# Patient Record
Sex: FEMALE | Race: WHITE | NOT HISPANIC OR LATINO | Employment: OTHER | URBAN - NONMETROPOLITAN AREA
[De-identification: names, ages, dates, MRNs, and addresses within clinical notes are randomized per-mention and may not be internally consistent; named-entity substitution may affect disease eponyms.]

---

## 2018-03-29 ENCOUNTER — OFFICE VISIT CONVERTED (OUTPATIENT)
Dept: FAMILY MEDICINE CLINIC | Age: 68
End: 2018-03-29
Attending: FAMILY MEDICINE

## 2018-03-30 LAB
ALBUMIN SERPL-MCNC: 4.4 G/DL
ALBUMIN/GLOB SERPL: 2 {RATIO}
ALP SERPL-CCNC: 69 IU/L
ALT SERPL-CCNC: 14 IU/L
AST SERPL-CCNC: 19 IU/L
BILIRUB SERPL-MCNC: 0.3 MG/DL
BUN SERPL-MCNC: 23 MG/DL
BUN/CREAT SERPL: 24
CALCIUM SERPL-MCNC: 9.1 MG/DL
CHLORIDE SERPL-SCNC: 102 MMOL/L
CHOLEST SERPL-MCNC: 227 MG/DL
CO2 SERPL-SCNC: 24 MMOL/L
CONV TOTAL PROTEIN: 6.6 G/DL
CREAT UR-MCNC: 0.97 MG/DL
ERYTHROCYTE [DISTWIDTH] IN BLOOD BY AUTOMATED COUNT: 14.6 %
GLOBULIN UR ELPH-MCNC: 2.2 G/DL
GLUCOSE SERPL-MCNC: 85 MG/DL
HCT VFR BLD AUTO: 37.1 %
HDLC SERPL-MCNC: 65 MG/DL
HGB BLD-MCNC: 12.1 G/DL
LDLC SERPL CALC-MCNC: 142 MG/DL
MCH RBC QN AUTO: 26.2 PG
MCHC RBC AUTO-ENTMCNC: 32.6 G/DL
MCV RBC AUTO: 80 FL
PLATELET # BLD AUTO: 194 X10E3/UL
POTASSIUM SERPL-SCNC: 4.6 MMOL/L
RBC # BLD AUTO: 4.62 X10E6/UL
SODIUM SERPL-SCNC: 142 MMOL/L
TRIGL SERPL-MCNC: 98 MG/DL
VLDLC SERPL-MCNC: 20 MG/DL
WBC # BLD AUTO: 4.1 X10E3/UL

## 2018-11-12 ENCOUNTER — OFFICE VISIT CONVERTED (OUTPATIENT)
Dept: FAMILY MEDICINE CLINIC | Age: 68
End: 2018-11-12
Attending: FAMILY MEDICINE

## 2018-12-11 ENCOUNTER — OFFICE VISIT CONVERTED (OUTPATIENT)
Dept: FAMILY MEDICINE CLINIC | Age: 68
End: 2018-12-11
Attending: FAMILY MEDICINE

## 2019-01-29 ENCOUNTER — HOSPITAL ENCOUNTER (OUTPATIENT)
Dept: OTHER | Facility: HOSPITAL | Age: 69
Discharge: HOME | End: 2019-01-29
Attending: FAMILY MEDICINE

## 2019-01-29 LAB
ANION GAP SERPL CALC-SCNC: 17 MMOL/L (ref 8–19)
BASOPHILS # BLD MANUAL: 0.03 10*3/UL (ref 0–0.2)
BASOPHILS NFR BLD MANUAL: 0.7 % (ref 0–3)
BUN SERPL-MCNC: 29 MG/DL (ref 5–25)
BUN/CREAT SERPL: 30 {RATIO} (ref 6–20)
CALCIUM SERPL-MCNC: 9.3 MG/DL (ref 8.7–10.4)
CHLORIDE SERPL-SCNC: 102 MMOL/L (ref 99–111)
CONV CO2: 26 MMOL/L (ref 22–32)
CREAT UR-MCNC: 0.98 MG/DL (ref 0.5–0.9)
DEPRECATED RDW RBC AUTO: 42 FL
EOSINOPHIL # BLD MANUAL: 0.14 10*3/UL (ref 0–0.7)
EOSINOPHIL NFR BLD MANUAL: 3.2 % (ref 0–7)
ERYTHROCYTE [DISTWIDTH] IN BLOOD BY AUTOMATED COUNT: 14 % (ref 11.5–14.5)
GFR SERPLBLD BASED ON 1.73 SQ M-ARVRAT: 60 ML/MIN/{1.73_M2}
GLUCOSE SERPL-MCNC: 89 MG/DL (ref 65–99)
GRANS (ABSOLUTE): 2.33 10*3/UL (ref 2–8)
GRANS: 53.2 % (ref 30–85)
HBA1C MFR BLD: 11.5 G/DL (ref 12–16)
HCT VFR BLD AUTO: 36.4 % (ref 37–47)
IMM GRANULOCYTES # BLD: 0.01 10*3/UL (ref 0–0.54)
IMM GRANULOCYTES NFR BLD: 0.2 % (ref 0–0.43)
LYMPHOCYTES # BLD MANUAL: 1.45 10*3/UL (ref 1–5)
LYMPHOCYTES NFR BLD MANUAL: 9.6 % (ref 3–10)
MCH RBC QN AUTO: 25.8 PG (ref 27–31)
MCHC RBC AUTO-ENTMCNC: 31.6 G/DL (ref 33–37)
MCV RBC AUTO: 81.6 FL (ref 81–99)
MONOCYTES # BLD AUTO: 0.42 10*3/UL (ref 0.2–1.2)
OSMOLALITY SERPL CALC.SUM OF ELEC: 295 MOSM/KG (ref 273–304)
PLATELET # BLD AUTO: 208 10*3/UL (ref 130–400)
PMV BLD AUTO: 10.1 FL (ref 7.4–10.4)
POTASSIUM SERPL-SCNC: 4.5 MMOL/L (ref 3.5–5.3)
RBC # BLD AUTO: 4.46 10*6/UL (ref 4.2–5.4)
SODIUM SERPL-SCNC: 140 MMOL/L (ref 135–147)
VARIANT LYMPHS NFR BLD MANUAL: 33.1 % (ref 20–45)
WBC # BLD AUTO: 4.38 10*3/UL (ref 4.8–10.8)

## 2019-01-30 LAB
IRON SATN MFR SERPL: 10 % (ref 20–55)
IRON SERPL-MCNC: 44 UG/DL (ref 60–170)
RBC # BLD AUTO: 4.4 10*6/UL (ref 4.2–5.4)
RETICS # AUTO: 42.7 10*3/UL
RETICS/RBC NFR AUTO: 0.97 % (ref 0.5–1.7)
TIBC SERPL-MCNC: 455 UG/DL (ref 245–450)
TRANSFERRIN SERPL-MCNC: 318 MG/DL (ref 250–380)
WBC # BLD AUTO: 4.39 10*3/UL (ref 4.8–10.8)

## 2019-03-13 ENCOUNTER — HOSPITAL ENCOUNTER (OUTPATIENT)
Dept: OTHER | Facility: HOSPITAL | Age: 69
Discharge: HOME | End: 2019-03-13
Attending: FAMILY MEDICINE

## 2019-03-13 ENCOUNTER — OFFICE VISIT CONVERTED (OUTPATIENT)
Dept: FAMILY MEDICINE CLINIC | Age: 69
End: 2019-03-13
Attending: NURSE PRACTITIONER

## 2019-03-13 ENCOUNTER — HOSPITAL ENCOUNTER (OUTPATIENT)
Dept: OTHER | Facility: HOSPITAL | Age: 69
Discharge: HOME | End: 2019-03-13
Attending: NURSE PRACTITIONER

## 2019-03-13 LAB
ANION GAP SERPL CALC-SCNC: 14 MMOL/L (ref 8–19)
BUN SERPL-MCNC: 23 MG/DL (ref 5–25)
BUN/CREAT SERPL: 21 {RATIO} (ref 6–20)
CALCIUM SERPL-MCNC: 9.2 MG/DL (ref 8.7–10.4)
CHLORIDE SERPL-SCNC: 107 MMOL/L (ref 99–111)
CONV CO2: 26 MMOL/L (ref 22–32)
CREAT UR-MCNC: 1.08 MG/DL (ref 0.5–0.9)
ERYTHROCYTE [DISTWIDTH] IN BLOOD BY AUTOMATED COUNT: 16.7 % (ref 11.5–14.5)
GFR SERPLBLD BASED ON 1.73 SQ M-ARVRAT: 53 ML/MIN/{1.73_M2}
GLUCOSE SERPL-MCNC: 85 MG/DL (ref 65–99)
HBA1C MFR BLD: 11.9 G/DL (ref 12–16)
HCT VFR BLD AUTO: 37.7 % (ref 37–47)
IRON SATN MFR SERPL: 41 % (ref 20–55)
IRON SERPL-MCNC: 171 UG/DL (ref 60–170)
MCH RBC QN AUTO: 26.1 PG (ref 27–31)
MCHC RBC AUTO-ENTMCNC: 31.6 G/DL (ref 33–37)
MCV RBC AUTO: 82.7 FL (ref 81–99)
OSMOLALITY SERPL CALC.SUM OF ELEC: 299 MOSM/KG (ref 273–304)
PLATELET # BLD AUTO: 180 10*3/UL (ref 130–400)
PMV BLD AUTO: 10.2 FL (ref 7.4–10.4)
POTASSIUM SERPL-SCNC: 4.1 MMOL/L (ref 3.5–5.3)
RBC # BLD AUTO: 4.56 10*6/UL (ref 4.2–5.4)
SODIUM SERPL-SCNC: 143 MMOL/L (ref 135–147)
TIBC SERPL-MCNC: 422 UG/DL (ref 245–450)
TRANSFERRIN SERPL-MCNC: 295 MG/DL (ref 250–380)
WBC # BLD AUTO: 4.19 10*3/UL (ref 4.8–10.8)

## 2019-03-14 LAB
RBC # BLD AUTO: 4.52 10*6/UL (ref 4.2–5.4)
RETICS # AUTO: 0.06 10*6/UL (ref 0.02–0.08)
RETICS/RBC NFR AUTO: 1.34 % (ref 0.5–1.7)
WBC # BLD AUTO: 4.17 10*3/UL (ref 4.8–10.8)

## 2019-03-15 LAB — EPO SERPL-ACNC: 16.1 MIU/ML (ref 2.6–18.5)

## 2019-03-17 LAB
CONV LAST MENSTURAL PERIOD: NORMAL
SPECIMEN SOURCE: NORMAL
SPECIMEN SOURCE: NORMAL
THIN PREP CVX: NORMAL

## 2019-03-20 ENCOUNTER — HOSPITAL ENCOUNTER (OUTPATIENT)
Dept: OTHER | Facility: HOSPITAL | Age: 69
Discharge: HOME | End: 2019-03-20
Attending: FAMILY MEDICINE

## 2019-06-10 ENCOUNTER — HOSPITAL ENCOUNTER (OUTPATIENT)
Dept: OTHER | Facility: HOSPITAL | Age: 69
Discharge: HOME | End: 2019-06-10
Attending: FAMILY MEDICINE

## 2019-06-10 LAB
RBC # BLD AUTO: 4.49 10*6/UL (ref 4.2–5.4)
RETICS # AUTO: 0.06 10*6/UL (ref 0.02–0.08)
RETICS/RBC NFR AUTO: 1.26 % (ref 0.5–1.7)
WBC # BLD AUTO: 4.69 10*3/UL (ref 4.8–10.8)

## 2019-06-11 LAB — EPO SERPL-ACNC: 8.2 MIU/ML (ref 2.6–18.5)

## 2019-06-13 LAB
ANION GAP SERPL CALC-SCNC: 17 MMOL/L (ref 8–19)
BASOPHILS # BLD AUTO: 0.06 10*3/UL (ref 0–0.2)
BASOPHILS NFR BLD AUTO: 1.4 % (ref 0–3)
BUN SERPL-MCNC: 23 MG/DL (ref 5–25)
BUN/CREAT SERPL: 23 {RATIO} (ref 6–20)
CALCIUM SERPL-MCNC: 9.1 MG/DL (ref 8.7–10.4)
CHLORIDE SERPL-SCNC: 105 MMOL/L (ref 99–111)
CONV ABS IMM GRAN: 0.04 10*3/UL (ref 0–0.2)
CONV CO2: 25 MMOL/L (ref 22–32)
CONV IMMATURE GRAN: 0.9 % (ref 0–1.8)
CREAT UR-MCNC: 0.99 MG/DL (ref 0.5–0.9)
DEPRECATED RDW RBC AUTO: 46.3 FL (ref 36.4–46.3)
EOSINOPHIL # BLD AUTO: 0.1 10*3/UL (ref 0–0.7)
EOSINOPHIL # BLD AUTO: 2.3 % (ref 0–7)
ERYTHROCYTE [DISTWIDTH] IN BLOOD BY AUTOMATED COUNT: 13.7 % (ref 11.7–14.4)
GFR SERPLBLD BASED ON 1.73 SQ M-ARVRAT: 59 ML/MIN/{1.73_M2}
GLUCOSE SERPL-MCNC: 80 MG/DL (ref 65–99)
HBA1C MFR BLD: 12.9 G/DL (ref 12–16)
HCT VFR BLD AUTO: 41.1 % (ref 37–47)
IRON SATN MFR SERPL: 29 % (ref 20–55)
IRON SERPL-MCNC: 101 UG/DL (ref 60–170)
LYMPHOCYTES # BLD AUTO: 1.46 10*3/UL (ref 1–5)
MCH RBC QN AUTO: 28.9 PG (ref 27–31)
MCHC RBC AUTO-ENTMCNC: 31.4 G/DL (ref 33–37)
MCV RBC AUTO: 91.9 FL (ref 81–99)
MONOCYTES # BLD AUTO: 0.42 10*3/UL (ref 0.2–1.2)
MONOCYTES NFR BLD AUTO: 9.8 % (ref 3–10)
NEUTROPHILS # BLD AUTO: 2.2 10*3/UL (ref 2–8)
NEUTROPHILS NFR BLD AUTO: 51.5 % (ref 30–85)
NRBC CBCN: 0 % (ref 0–0.7)
OSMOLALITY SERPL CALC.SUM OF ELEC: 297 MOSM/KG (ref 273–304)
PLATELET # BLD AUTO: 160 10*3/UL (ref 130–400)
PMV BLD AUTO: 11.5 FL (ref 9.4–12.3)
POTASSIUM SERPL-SCNC: 4.7 MMOL/L (ref 3.5–5.3)
SODIUM SERPL-SCNC: 142 MMOL/L (ref 135–147)
TIBC SERPL-MCNC: 352 UG/DL (ref 245–450)
TRANSFERRIN SERPL-MCNC: 246 MG/DL (ref 250–380)
VARIANT LYMPHS NFR BLD MANUAL: 34.1 % (ref 20–45)

## 2019-06-14 ENCOUNTER — OFFICE VISIT CONVERTED (OUTPATIENT)
Dept: FAMILY MEDICINE CLINIC | Age: 69
End: 2019-06-14
Attending: FAMILY MEDICINE

## 2019-06-24 ENCOUNTER — HOSPITAL ENCOUNTER (OUTPATIENT)
Dept: OTHER | Facility: HOSPITAL | Age: 69
Discharge: HOME | End: 2019-06-24
Attending: FAMILY MEDICINE

## 2019-12-17 ENCOUNTER — OFFICE VISIT CONVERTED (OUTPATIENT)
Dept: FAMILY MEDICINE CLINIC | Age: 69
End: 2019-12-17
Attending: FAMILY MEDICINE

## 2020-01-06 ENCOUNTER — HOSPITAL ENCOUNTER (OUTPATIENT)
Dept: OTHER | Facility: HOSPITAL | Age: 70
Discharge: HOME | End: 2020-01-06
Attending: FAMILY MEDICINE

## 2020-01-06 LAB
ALBUMIN SERPL-MCNC: 4.1 G/DL (ref 3.5–5)
ALBUMIN/GLOB SERPL: 1.8 {RATIO} (ref 1.4–2.6)
ALP SERPL-CCNC: 76 U/L (ref 43–160)
ALT SERPL-CCNC: 36 U/L (ref 10–40)
ANION GAP SERPL CALC-SCNC: 16 MMOL/L (ref 8–19)
AST SERPL-CCNC: 22 U/L (ref 15–50)
BILIRUB SERPL-MCNC: 0.31 MG/DL (ref 0.2–1.3)
BUN SERPL-MCNC: 22 MG/DL (ref 5–25)
BUN/CREAT SERPL: 26 {RATIO} (ref 6–20)
CALCIUM SERPL-MCNC: 9.2 MG/DL (ref 8.7–10.4)
CHLORIDE SERPL-SCNC: 104 MMOL/L (ref 99–111)
CHOLEST SERPL-MCNC: 170 MG/DL (ref 107–200)
CHOLEST/HDLC SERPL: 3.8 {RATIO} (ref 3–6)
CONV CO2: 26 MMOL/L (ref 22–32)
CONV TOTAL PROTEIN: 6.4 G/DL (ref 6.3–8.2)
CREAT UR-MCNC: 0.85 MG/DL (ref 0.5–0.9)
GFR SERPLBLD BASED ON 1.73 SQ M-ARVRAT: >60 ML/MIN/{1.73_M2}
GLOBULIN UR ELPH-MCNC: 2.3 G/DL (ref 2–3.5)
GLUCOSE SERPL-MCNC: 85 MG/DL (ref 65–99)
HDLC SERPL-MCNC: 45 MG/DL (ref 40–60)
LDLC SERPL CALC-MCNC: 98 MG/DL (ref 70–100)
OSMOLALITY SERPL CALC.SUM OF ELEC: 297 MOSM/KG (ref 273–304)
POTASSIUM SERPL-SCNC: 4.3 MMOL/L (ref 3.5–5.3)
SODIUM SERPL-SCNC: 142 MMOL/L (ref 135–147)
TRIGL SERPL-MCNC: 137 MG/DL (ref 40–150)
VLDLC SERPL-MCNC: 27 MG/DL (ref 5–37)

## 2020-06-23 ENCOUNTER — OFFICE VISIT CONVERTED (OUTPATIENT)
Dept: FAMILY MEDICINE CLINIC | Age: 70
End: 2020-06-23
Attending: FAMILY MEDICINE

## 2020-06-25 ENCOUNTER — HOSPITAL ENCOUNTER (OUTPATIENT)
Dept: OTHER | Facility: HOSPITAL | Age: 70
Discharge: HOME | End: 2020-06-25
Attending: FAMILY MEDICINE

## 2020-06-25 LAB
25(OH)D3 SERPL-MCNC: 38.1 NG/ML (ref 30–100)
ALBUMIN SERPL-MCNC: 4.4 G/DL (ref 3.5–5)
ALBUMIN/GLOB SERPL: 2 {RATIO} (ref 1.4–2.6)
ALP SERPL-CCNC: 84 U/L (ref 43–160)
ALT SERPL-CCNC: 20 U/L (ref 10–40)
ANION GAP SERPL CALC-SCNC: 15 MMOL/L (ref 8–19)
AST SERPL-CCNC: 19 U/L (ref 15–50)
BASOPHILS # BLD MANUAL: 0.04 10*3/UL (ref 0–0.2)
BASOPHILS NFR BLD MANUAL: 0.8 % (ref 0–3)
BILIRUB SERPL-MCNC: 0.31 MG/DL (ref 0.2–1.3)
BUN SERPL-MCNC: 26 MG/DL (ref 5–25)
BUN/CREAT SERPL: 27 {RATIO} (ref 6–20)
CALCIUM SERPL-MCNC: 9.4 MG/DL (ref 8.7–10.4)
CHLORIDE SERPL-SCNC: 104 MMOL/L (ref 99–111)
CHOLEST SERPL-MCNC: 161 MG/DL (ref 107–200)
CHOLEST/HDLC SERPL: 2.9 {RATIO} (ref 3–6)
CONV CO2: 26 MMOL/L (ref 22–32)
CONV TOTAL PROTEIN: 6.6 G/DL (ref 6.3–8.2)
CREAT UR-MCNC: 0.96 MG/DL (ref 0.5–0.9)
DEPRECATED RDW RBC AUTO: 40.4 FL
EOSINOPHIL # BLD MANUAL: 0.14 10*3/UL (ref 0–0.7)
EOSINOPHIL NFR BLD MANUAL: 2.7 % (ref 0–7)
ERYTHROCYTE [DISTWIDTH] IN BLOOD BY AUTOMATED COUNT: 12.3 % (ref 11.5–14.5)
GFR SERPLBLD BASED ON 1.73 SQ M-ARVRAT: >60 ML/MIN/{1.73_M2}
GLOBULIN UR ELPH-MCNC: 2.2 G/DL (ref 2–3.5)
GLUCOSE SERPL-MCNC: 89 MG/DL (ref 65–99)
GRANS (ABSOLUTE): 2.7 10*3/UL (ref 2–8)
GRANS: 51.1 % (ref 30–85)
HBA1C MFR BLD: 13.2 G/DL (ref 12–16)
HCT VFR BLD AUTO: 39.5 % (ref 37–47)
HDLC SERPL-MCNC: 56 MG/DL (ref 40–60)
IMM GRANULOCYTES # BLD: 0.03 10*3/UL (ref 0–0.54)
IMM GRANULOCYTES NFR BLD: 0.6 % (ref 0–0.43)
IRON SATN MFR SERPL: 26 % (ref 20–55)
IRON SERPL-MCNC: 93 UG/DL (ref 60–170)
LDLC SERPL CALC-MCNC: 79 MG/DL (ref 70–100)
LYMPHOCYTES # BLD MANUAL: 1.76 10*3/UL (ref 1–5)
LYMPHOCYTES NFR BLD MANUAL: 11.4 % (ref 3–10)
MCH RBC QN AUTO: 29.9 PG (ref 27–31)
MCHC RBC AUTO-ENTMCNC: 33.4 G/DL (ref 33–37)
MCV RBC AUTO: 89.6 FL (ref 81–99)
MONOCYTES # BLD AUTO: 0.6 10*3/UL (ref 0.2–1.2)
OSMOLALITY SERPL CALC.SUM OF ELEC: 296 MOSM/KG (ref 273–304)
PLATELET # BLD AUTO: 180 10*3/UL (ref 130–400)
PMV BLD AUTO: 10.7 FL (ref 7.4–10.4)
POTASSIUM SERPL-SCNC: 4.3 MMOL/L (ref 3.5–5.3)
RBC # BLD AUTO: 4.41 10*6/UL (ref 4.2–5.4)
SODIUM SERPL-SCNC: 141 MMOL/L (ref 135–147)
TIBC SERPL-MCNC: 353 UG/DL (ref 245–450)
TRANSFERRIN SERPL-MCNC: 247 MG/DL (ref 250–380)
TRIGL SERPL-MCNC: 129 MG/DL (ref 40–150)
TSH SERPL-ACNC: 3.8 M[IU]/L (ref 0.27–4.2)
VARIANT LYMPHS NFR BLD MANUAL: 33.4 % (ref 20–45)
VLDLC SERPL-MCNC: 26 MG/DL (ref 5–37)
WBC # BLD AUTO: 5.27 10*3/UL (ref 4.8–10.8)

## 2021-01-18 ENCOUNTER — HOSPITAL ENCOUNTER (OUTPATIENT)
Dept: OTHER | Facility: HOSPITAL | Age: 71
Discharge: HOME | End: 2021-01-18
Attending: FAMILY MEDICINE

## 2021-01-18 ENCOUNTER — OFFICE VISIT CONVERTED (OUTPATIENT)
Dept: FAMILY MEDICINE CLINIC | Age: 71
End: 2021-01-18
Attending: FAMILY MEDICINE

## 2021-01-18 LAB
25(OH)D3 SERPL-MCNC: 31 NG/ML (ref 30–100)
ALBUMIN SERPL-MCNC: 4.4 G/DL (ref 3.5–5)
ALBUMIN/GLOB SERPL: 1.8 {RATIO} (ref 1.4–2.6)
ALP SERPL-CCNC: 70 U/L (ref 43–160)
ALT SERPL-CCNC: 15 U/L (ref 10–40)
ANION GAP SERPL CALC-SCNC: 11 MMOL/L (ref 8–19)
AST SERPL-CCNC: 18 U/L (ref 15–50)
BASOPHILS # BLD MANUAL: 0.04 10*3/UL (ref 0–0.2)
BASOPHILS NFR BLD MANUAL: 0.8 % (ref 0–3)
BILIRUB SERPL-MCNC: 0.39 MG/DL (ref 0.2–1.3)
BUN SERPL-MCNC: 28 MG/DL (ref 5–25)
BUN/CREAT SERPL: 28 {RATIO} (ref 6–20)
CALCIUM SERPL-MCNC: 9.7 MG/DL (ref 8.7–10.4)
CHLORIDE SERPL-SCNC: 101 MMOL/L (ref 99–111)
CHOLEST SERPL-MCNC: 159 MG/DL (ref 107–200)
CHOLEST/HDLC SERPL: 3.3 {RATIO} (ref 3–6)
CONV CO2: 30 MMOL/L (ref 22–32)
CONV TOTAL PROTEIN: 6.8 G/DL (ref 6.3–8.2)
CREAT UR-MCNC: 1.01 MG/DL (ref 0.5–0.9)
DEPRECATED RDW RBC AUTO: 39.9 FL
EOSINOPHIL # BLD MANUAL: 0.09 10*3/UL (ref 0–0.7)
EOSINOPHIL NFR BLD MANUAL: 1.8 % (ref 0–7)
ERYTHROCYTE [DISTWIDTH] IN BLOOD BY AUTOMATED COUNT: 12.1 % (ref 11.5–14.5)
GFR SERPLBLD BASED ON 1.73 SQ M-ARVRAT: 57 ML/MIN/{1.73_M2}
GLOBULIN UR ELPH-MCNC: 2.4 G/DL (ref 2–3.5)
GLUCOSE SERPL-MCNC: 85 MG/DL (ref 65–99)
GRANS (ABSOLUTE): 2.8 10*3/UL (ref 2–8)
GRANS: 55.6 % (ref 30–85)
HBA1C MFR BLD: 12.9 G/DL (ref 12–16)
HCT VFR BLD AUTO: 38.3 % (ref 37–47)
HDLC SERPL-MCNC: 48 MG/DL (ref 40–60)
IMM GRANULOCYTES # BLD: 0.02 10*3/UL (ref 0–0.54)
IMM GRANULOCYTES NFR BLD: 0.4 % (ref 0–0.43)
IRON SATN MFR SERPL: 32 % (ref 20–55)
IRON SERPL-MCNC: 113 UG/DL (ref 60–170)
LDLC SERPL CALC-MCNC: 65 MG/DL (ref 70–100)
LYMPHOCYTES # BLD MANUAL: 1.63 10*3/UL (ref 1–5)
LYMPHOCYTES NFR BLD MANUAL: 9.1 % (ref 3–10)
MCH RBC QN AUTO: 29.7 PG (ref 27–31)
MCHC RBC AUTO-ENTMCNC: 33.7 G/DL (ref 33–37)
MCV RBC AUTO: 88 FL (ref 81–99)
MONOCYTES # BLD AUTO: 0.46 10*3/UL (ref 0.2–1.2)
OSMOLALITY SERPL CALC.SUM OF ELEC: 291 MOSM/KG (ref 273–304)
PLATELET # BLD AUTO: 167 10*3/UL (ref 130–400)
PMV BLD AUTO: 9.2 FL (ref 7.4–10.4)
POTASSIUM SERPL-SCNC: 4.1 MMOL/L (ref 3.5–5.3)
RBC # BLD AUTO: 4.35 10*6/UL (ref 4.2–5.4)
SODIUM SERPL-SCNC: 138 MMOL/L (ref 135–147)
TIBC SERPL-MCNC: 356 UG/DL (ref 245–450)
TRANSFERRIN SERPL-MCNC: 249 MG/DL (ref 250–380)
TRIGL SERPL-MCNC: 231 MG/DL (ref 40–150)
VARIANT LYMPHS NFR BLD MANUAL: 32.3 % (ref 20–45)
VLDLC SERPL-MCNC: 46 MG/DL (ref 5–37)
WBC # BLD AUTO: 5.04 10*3/UL (ref 4.8–10.8)

## 2021-05-18 NOTE — PROGRESS NOTES
Linda Mitchell 1950     Office/Outpatient Visit    Visit Date: Mon, Nov 12, 2018 10:38 am    Provider: Hudson Leon MD (Assistant: Sahra Gay MA)    Location: [facility]        Electronically signed by Hudson Leon MD on  11/16/2018 06:35:33 AM                             SUBJECTIVE:        CC:     Ms. Mitchell is a 68 year old White female.  She is here today following a transition of care from the emergency department ( [facility] on 11-07-18 for chest pain. She was examined and then sent to [facility]. She was released on 11-08-18. ).  Patient is here for medication consultation.;         HPI:         Patient presents with hypertension.  She did not bring her blood pressure diary, but says that pressures have been okay.  She is tolerating the medication well without side effects.  Compliance with treatment has been good.          Dx with hyperlipidemia; compliance with treatment has been good.  She specifically denies associated symptoms, including muscle pain and weakness.      Recently dx with NSTEMI.  Had CP and went to [facility] with positive Troponin so sent to [facility] where she had Cath that showed two blockages, but did not require PTCA or stent.  They did send her home with Palvix and ASA. She has follow up appt Cardiologist in 6 weeks.     She says that at night her legs get restless and she had been taking Aleve which seemed to work well for her, but they told her not to take it any more when she left the hospital. She says Tylenol just never did help her with pain..  She has not tried it for the restless legs.      ROS:     CONSTITUTIONAL:  Negative for chills, fatigue, fever, and weight change.      EYES:  Negative for blurred vision.      CARDIOVASCULAR:  Negative for chest pain, orthopnea, paroxysmal nocturnal dyspnea and pedal edema.      RESPIRATORY:  Negative for dyspnea.      GASTROINTESTINAL:  Negative for abdominal pain, constipation, diarrhea,  nausea and vomiting.      GENITOURINARY:  Negative for dysuria and frequent urination.      NEUROLOGICAL:  Negative for dizziness, headaches, paresthesias, and weakness.      PSYCHIATRIC:  Negative for anxiety, depression, and sleep disturbances.          PMH/FMH/SH:     Last Reviewed on 11/12/2018 11:07 AM by Hudson Leon    Past Medical History:                 PAST MEDICAL HISTORY         Gastroesophageal Reflux Disease         CURRENT MEDICAL PROVIDERS:    Obstetrician/Gynecologist: dr. jacobsen             ADVANCED DIRECTIVES: None         PREVENTIVE HEALTH MAINTENANCE             MAMMOGRAM: Done within last 2 years and results in are chart was last done 2/2017 with normal results     PAP SMEAR: was last done 2/2016 with normal results         PAST MEDICAL HISTORY                 ADVANCED DIRECTIVES: None         Surgical History:         COLONOSCOPY: was last done 2012     Cardiac Cath;         Family History:         Positive for Coronary Artery Disease ( mother; sister -- MI and required cardioversion ).          Social History: Lived in [place]. Natividad Naylor's niece.     Occupation:      Marital Status:      Children: None         Tobacco/Alcohol/Supplements:     Last Reviewed on 11/12/2018 10:49 AM by Sahra Gay    Tobacco: She has never smoked.          Alcohol:  Does not drink alcohol and never has.          Substance Abuse History:     Last Reviewed on 11/12/2018 10:49 AM by Sahra Gay        Mental Health History:     Last Reviewed on 11/12/2018 10:49 AM by Sahra Gay        Communicable Diseases (eg STDs):     Last Reviewed on 11/12/2018 10:49 AM by Sahra Gay            Allergies:     Last Reviewed on 11/12/2018 10:49 AM by Sahra Gay      No Known Drug Allergies.         Current Medications:     Last Reviewed on 11/12/2018 10:49 AM by Sahra Gay    Losartan 50mg Tablet Take 1 tablet(s) by mouth daily     Nexium 24HR 20mg Capsules,  Delayed Release Take 1 capsule(s) by mouth daily     Excedrin Migraine Tablet 2 tabs bid     osteo biflex with vitamin d     Calcium 600 Tablet 1 tab bid     Vitamin E 1,000IU Capsules 1 capsule daily     Atorvastatin Calcium 80mg Tablet Take one tablet by mouth at bedtime     Clopidogrel 75mg Tablet 1 tab daily     Metoprolol Tartrate 25mg Tablet 1/2 tab 2x daily     OTC Pepcid prn         OBJECTIVE:        Vitals:         Current: 11/12/2018 10:41:24 AM    Ht:  5 ft, 3 in;  Wt: 132 lbs;  BMI: 23.4    T: 97.7 F (oral);  BP: 121/41 mm Hg (right arm, sitting);  P: 57 bpm (right arm (BP Cuff), sitting);  sCr: 0.97 mg/dL;  GFR: 52.50        Exams:     PHYSICAL EXAM:     GENERAL: well developed, well nourished;  well groomed;  no apparent distress;     EYES: nonicteric;     E/N/T: EARS:  normal external auditory canals and tympanic membranes;  grossly normal hearing; OROPHARYNX:  normal mucosa, dentition, gingiva, and posterior pharynx;     NECK:  supple, full ROM; no thyromegaly; no carotid bruits;     RESPIRATORY: normal appearance and symmetric expansion of chest wall; normal respiratory rate and pattern with no distress; normal breath sounds with no rales, rhonchi, wheezes or rubs;     CARDIOVASCULAR: normal rate; rhythm is regular;  a systolic murmur is noted: it is grade 2/6 and heard best at the upper right sternal border;     MUSCULOSKELETAL: no pedal edema;     NEUROLOGIC: no focal lateralizing deficits.;     PSYCHIATRIC:  appropriate affect and demeanor; normal speech pattern; grossly normal memory;         Procedures:     Vaccination against other viral diseases, Influenza     1. Influenza high dose 0.5 ml unit dose, [facility], ABN signed given IM in the right upper arm;  lot number [id number]; expires 6/4/19         Vaccination against streptococcus pneumoniae     1. Pneumovax (pneumococcal PPSV23): 0.5 ml unit dose given IM in the left upper arm; administered by [facility];  lot number [id number]; expires 4/16/20              ASSESSMENT           401.1   I10  Hypertension              DDx:     272.4   E78.2  Hyperlipidemia              DDx:     414.9   I25.10  CAD              DDx:     V04.81   Z23  Vaccination against other viral diseases, Influenza              DDx:     V03.82   Z23  Vaccination against streptococcus pneumoniae              DDx:     333.94   G25.81  Restless legs syndrome (RLS)              DDx:         ORDERS:         Procedures Ordered:       63721  Fluzone High Dose  (In-House)         13114  Pneumococcal polysaccharide vaccine, 23-valent, adult or immunosuppressed patient dosage, for use in  (In-House)         31394  Immunization administration; one vaccine  (In-House)         05388  Immunization administration; each additional vaccine/toxoid  (In-House)           Other Orders:         Administration of influenza virus vaccine (x1)         Administration of pneumococcal vaccine (x1)                 PLAN: We will send for records from [facility]          Hypertension cont rx          Hyperlipidemia the MI has convinced her to take the medication.          CAD cont risk factor reduction. She has follow up with Cardiology scheduled.          Vaccination against other viral diseases, Influenza         IMMUNIZATIONS given today: Influenza HIGH Dose.            Orders:       13231  Fluzone High Dose  (In-House)                     Administration of influenza virus vaccine (x1)       53240  Immunization administration; one vaccine  (In-House)            Vaccination against streptococcus pneumoniae         IMMUNIZATIONS given today: Pneumovax.            Orders:       03436  Pneumococcal polysaccharide vaccine, 23-valent, adult or immunosuppressed patient dosage, for use in  (In-House)                     Administration of pneumococcal vaccine (x1)       39479  Immunization administration; each additional vaccine/toxoid  (In-House)            Restless legs syndrome (RLS) WE discussed the option of  anti-parkinsons medications, but in the end she says she would rather try the tylenol first.             CHARGE CAPTURE           **Please note: ICD descriptions below are intended for billing purposes only and may not represent clinical diagnoses**        Primary Diagnosis:         401.1 Hypertension            I10    Essential (primary) hypertension              Orders:          42514   Office/outpatient visit; established patient, level 4  (In-House)           272.4 Hyperlipidemia            E78.2    Mixed hyperlipidemia    414.9 CAD            I25.10    Atherosclerotic heart disease of native coronary artery without angina pectoris    V04.81 Vaccination against other viral diseases, Influenza            Z23    Encounter for immunization              Orders:          38949   Fluzone High Dose  (In-House)                                           Administration of influenza virus vaccine (x1)           49614   Immunization administration; one vaccine  (In-House)           V03.82 Vaccination against streptococcus pneumoniae            Z23    Encounter for immunization              Orders:          03856   Pneumococcal polysaccharide vaccine, 23-valent, adult or immunosuppressed patient dosage, for use in  (In-House)                                           Administration of pneumococcal vaccine (x1)           82905   Immunization administration; each additional vaccine/toxoid  (In-House)           333.94 Restless legs syndrome (RLS)            G25.81    Restless legs syndrome

## 2021-05-18 NOTE — PROGRESS NOTES
Linda Mitchell 1950     Office/Outpatient Visit    Visit Date: Tue, Dec 11, 2018 08:44 am    Provider: Hudson Leon MD (Assistant: Ese Alaniz MA)    Location: [facility]        Electronically signed by Hudson Leon MD on  12/11/2018 09:30:23 AM                             SUBJECTIVE:        CC:     Ms. Mitchell is a 68 year old White female.  This is a follow-up visit.          HPI:         Patient to be evaluated for hypertension.  She did not bring her blood pressure diary, but says that pressures have been okay.  She is tolerating the medication well without side effects.  Compliance with treatment has been good.  Has only taken 17 doses of metoprolol since the MI because her holding if HR<60         Additionally, she presents with history of hyperlipidemia.  compliance with treatment has been good.  She specifically denies associated symptoms, including muscle pain and weakness.          With regard to the cAD, she is here today is not having any problems with chest pains or symptoms related to CAD..  Has appt to see Cardiology Friday. She says that her restless leg issues are doing better now and tylenol works most of the time She is not interested in cardiac rehab.  She has been back at her routine activities like painting and jorge and tolerating fine.     ROS:     CONSTITUTIONAL:  Negative for chills, fatigue, fever, and weight change.      EYES:  Negative for blurred vision.      CARDIOVASCULAR:  Negative for chest pain, orthopnea, paroxysmal nocturnal dyspnea and pedal edema.      RESPIRATORY:  Negative for dyspnea.      GASTROINTESTINAL:  Negative for abdominal pain, constipation, diarrhea, nausea and vomiting.      GENITOURINARY:  Negative for dysuria and frequent urination.      NEUROLOGICAL:  Negative for dizziness, headaches, paresthesias, and weakness.      PSYCHIATRIC:  Negative for anxiety, depression, and sleep disturbances.          PMH/FMH/SH:     Last  Reviewed on 11/12/2018 11:07 AM by Hudson Leon    Past Medical History:                 PAST MEDICAL HISTORY         Gastroesophageal Reflux Disease         CURRENT MEDICAL PROVIDERS:    Obstetrician/Gynecologist: dr. jacobsen             ADVANCED DIRECTIVES: None         PREVENTIVE HEALTH MAINTENANCE             MAMMOGRAM: Done within last 2 years and results in are chart was last done 2/2017 with normal results     PAP SMEAR: was last done 2/2016 with normal results         PAST MEDICAL HISTORY                 ADVANCED DIRECTIVES: None         Surgical History:         COLONOSCOPY: was last done 2012     Cardiac Cath;         Family History:         Positive for Coronary Artery Disease ( mother; sister -- MI and required cardioversion ).          Social History: Lived in [place]. Natividad Naylor's niece.     Occupation:      Marital Status:      Children: None         Tobacco/Alcohol/Supplements:     Last Reviewed on 11/12/2018 10:49 AM by Sahra Gay    Tobacco: She has never smoked.          Alcohol:  Does not drink alcohol and never has.          Substance Abuse History:     Last Reviewed on 11/12/2018 10:49 AM by Sahra Gay        Mental Health History:     Last Reviewed on 11/12/2018 10:49 AM by Sahra Gay        Communicable Diseases (eg STDs):     Last Reviewed on 11/12/2018 10:49 AM by Sahra Gay            Allergies:     Last Reviewed on 11/12/2018 10:49 AM by Sahra Gay      No Known Drug Allergies.         Current Medications:     Last Reviewed on 11/12/2018 10:49 AM by Sahra Gay    Losartan 50mg Tablet Take 1 tablet(s) by mouth daily     Atorvastatin Calcium 80mg Tablet Take one tablet by mouth at bedtime     Clopidogrel 75mg Tablet 1 tab daily     Metoprolol Tartrate 25mg Tablet 1/2 tab 2x daily     OTC Pepcid prn     Nexium 24HR 20mg Capsules, Delayed Release Take 1 capsule(s) by mouth daily     Excedrin Migraine Tablet 2 tabs bid      osteo biflex with vitamin d     Calcium 600 Tablet 1 tab bid     Vitamin E 1,000IU Capsules 1 capsule daily         OBJECTIVE:        Vitals:         Current: 12/11/2018 8:54:49 AM    Ht:  5 ft, 3 in;  Wt: 128.2 lbs;  BMI: 22.7    T: 97.5 F (oral);  BP: 127/74 mm Hg (left arm, sitting);  P: 57 bpm (left arm (BP Cuff), sitting);  sCr: 0.97 mg/dL;  GFR: 51.86        Exams:     PHYSICAL EXAM:     GENERAL: well developed, well nourished;  well groomed;  no apparent distress;     EYES: nonicteric;     E/N/T: EARS:  normal external auditory canals and tympanic membranes;  grossly normal hearing; OROPHARYNX:  normal mucosa, dentition, gingiva, and posterior pharynx;     NECK:  supple, full ROM; no thyromegaly; no carotid bruits;     RESPIRATORY: normal appearance and symmetric expansion of chest wall; normal respiratory rate and pattern with no distress; normal breath sounds with no rales, rhonchi, wheezes or rubs;     CARDIOVASCULAR: normal rate; rhythm is regular;  no systolic murmur;     MUSCULOSKELETAL: no pedal edema;     NEUROLOGIC: no focal lateralizing deficits.;     PSYCHIATRIC:  appropriate affect and demeanor; normal speech pattern; grossly normal memory;         ASSESSMENT           401.1   I10  Hypertension              DDx:     272.4   E78.2  Hyperlipidemia              DDx:     414.9   I25.10  CAD              DDx:         ORDERS:         Lab Orders:       [id number]  HTN - [facility] CMP AND LIPID: [id number], [id number]  (Send-Out)         70564  BDCB - [facility] CBC w/o diff  (Send-Out)         APPTO  Appointment need  (In-House)                   PLAN:          Hypertension cont rx, she will discuss metoprolol with cardiology and is hoping to be able to stop it     LABORATORY:  Labs ordered to be performed today include CBC W/O DIFF.      FOLLOW-UP: Schedule a follow-up visit in 6 months..            Orders:       [id number]  BDCB2 - [facility] CBC w/o diff  (Send-Out)         APPTO  Appointment need  (In-House)            Hyperlipidemia      LABORATORY:  Labs ordered to be performed today include HTN/Lipid Panel: CMP, Lipid.            Orders:       [id number]  HTN - [facility] CMP AND LIPID: [id number], [id number]  (Send-Out)            CAD she will follow with cardiology and discuss the metoprolol             Patient Recommendations:        For  Hypertension:     Schedule a follow-up visit in 6 months.                APPOINTMENT INFORMATION:        Monday Tuesday Wednesday Thursday Friday Saturday Sunday            Time:___________________AM  PM   Date:_____________________             CHARGE CAPTURE           **Please note: ICD descriptions below are intended for billing purposes only and may not represent clinical diagnoses**        Primary Diagnosis:         401.1 Hypertension            I10    Essential (primary) hypertension              Orders:          [id number]   Office/outpatient visit; established patient, level 4  (In-House)             APPTO   Appointment need  (In-House)           272.4 Hyperlipidemia            E78.2    Mixed hyperlipidemia    414.9 CAD            I25.10    Atherosclerotic heart disease of native coronary artery without angina pectoris

## 2021-05-18 NOTE — PROGRESS NOTES
Linda Mitchell  1950     Office/Outpatient Visit    Visit Date: Tue, Dec 17, 2019 08:50 am    Provider: Hudson Leon MD (Assistant: Amina Chiang MA)    Location: [facility]        Electronically signed by Hudson Leon MD on  12/17/2019 10:07:25 AM                             Subjective:        CC: Mrs. Mitchell is a 69 year old White female.  This is a follow-up visit.  check up;         HPI:           Patient to be evaluated for essential (primary) hypertension.  Compliance with treatment has been good.  She is tolerating the medication well without side effects.  Mrs. Mitchell does not check her blood pressure other than at her clinic appointments.  She did sell her house after 5 years so less stress. Since BP up the last three visits is willing to increase meds          In regard to the mixed hyperlipidemia, compliance with treatment has been good.  She specifically denies associated symptoms, including muscle pain and weakness.      ROS:     CONSTITUTIONAL:  Negative for chills, fatigue, fever, and weight change.      EYES:  Negative for blurred vision.      CARDIOVASCULAR:  Negative for chest pain, orthopnea, paroxysmal nocturnal dyspnea and pedal edema.      RESPIRATORY:  Negative for dyspnea.      GASTROINTESTINAL:  Negative for abdominal pain, constipation, diarrhea, nausea and vomiting.      GENITOURINARY:  Negative for dysuria and frequent urination.      NEUROLOGICAL:  Negative for dizziness, headaches, paresthesias, and weakness.      PSYCHIATRIC:  Negative for anxiety, depression, and sleep disturbances.          Past Medical History / Family History / Social History:         Last Reviewed on 12/17/2019 10:05 AM by Hudson Leon    Past Medical History:                 PAST MEDICAL HISTORY         Coronary Artery Disease: dx'd in 11/2019;  she has had an angiogram;     Gastroesophageal Reflux Disease         CURRENT MEDICAL PROVIDERS:    Obstetrician/Gynecologist:  dr. jacobsen             ADVANCED DIRECTIVES: None         PREVENTIVE HEALTH MAINTENANCE             BONE DENSITY: was last done 2016 osteopenic     COLORECTAL CANCER SCREENING: Up to date (colonoscopy q10y; sigmoidoscopy q5y; Cologuard q3y) was last done 2012; colonoscopy with normal results     DENTAL CLEANING: unsure     EYE EXAM: was last done 2017     MAMMOGRAM: Done within last 2 years and results in are chart was last done 3-13-19 with normal results     PAP SMEAR: was last done march 2019 with normal results         PAST MEDICAL HISTORY                 ADVANCED DIRECTIVES: None         Surgical History:         COLONOSCOPY: was last done 2012     Cardiac Cath;         Family History:         Positive for Coronary Artery Disease ( mother; sister -- MI and required cardioversion ).          Social History: Lived in [place]. Natividad Naylor's niece.     Occupation:      Marital Status:      Children: None         Tobacco/Alcohol/Supplements:     Last Reviewed on 12/17/2019 08:50 AM by Amina Chiang    Tobacco: She has never smoked.          Alcohol:  Does not drink alcohol and never has.          Substance Abuse History:     Last Reviewed on 11/12/2018 10:49 AM by Sahra Gay        Mental Health History:     Last Reviewed on 11/12/2018 10:49 AM by Sahra Gay        Communicable Diseases (eg STDs):     Last Reviewed on 11/12/2018 10:49 AM by Sahra Gay        Allergies:     Last Reviewed on 12/17/2019 08:50 AM by Amina Chiang    No Known Allergies.        Current Medications:     Last Reviewed on 12/17/2019 08:50 AM by Amina Chiang    Vitamin E 1,000 unit oral capsule [1 capsule daily]    Calcium 600 600 mg calcium (1,500 mg) oral tablet [1 tab bid]    Losartan 50mg Tablet [Take 1 tablet(s) by mouth daily]    osteo biflex with vitamin d     Excedrin Migraine 250-250-65 mg oral tablet [2 tabs PRN]    Nexium 24HR 20 mg oral capsule,delayed release (enteric coated) [Take 1  capsule(s) by mouth daily]    atorvastatin 80 mg oral tablet [Take one tablet by mouth at bedtime]    metoprolol tartrate 25mg Tablet [1/2 tab once daily]    Clopidogrel 75 mg oral tablet [1 tab daily]    OTC Pepcid prn      ASA 81mg - Tablet one po daily      Vitamin E      Iron 65mg x 2 a day     Tylenol 500 PRN         Objective:        Vitals:         Current: 12/17/2019 8:54:05 AM    Ht:  5 ft, 3 in;  Wt: 135.6 lbs;  BMI: 24.0T: 97.6 F (oral);  BP: 148/65 mm Hg (right arm, sitting);  P: 50 bpm (right arm (BP Cuff), sitting);  sCr: 0.99 mg/dL;  GFR: 51.34        Exams:     PHYSICAL EXAM:     GENERAL: well developed, well nourished;  well groomed;  no apparent distress;     EYES: nonicteric;     E/N/T: EARS:  normal external auditory canals and tympanic membranes;  grossly normal hearing; OROPHARYNX:  normal mucosa, dentition, gingiva, and posterior pharynx;     NECK:  supple, full ROM; no thyromegaly; no carotid bruits;     RESPIRATORY: normal appearance and symmetric expansion of chest wall; normal respiratory rate and pattern with no distress; normal breath sounds with no rales, rhonchi, wheezes or rubs;     CARDIOVASCULAR: normal rate; rhythm is regular;  a systolic murmur is noted: it is grade 1/6 and heard best at the lower left sternal border;     LYMPHATIC: no enlargement of cervical or facial nodes; no supraclavicular nodes;     MUSCULOSKELETAL: no pedal edema;     NEUROLOGIC: no focal lateralizing deficits.;     PSYCHIATRIC:  appropriate affect and demeanor; normal speech pattern; grossly normal memory;         Assessment:         I10   Essential (primary) hypertension       E78.2   Mixed hyperlipidemia           ORDERS:         Meds Prescribed:       [Refilled] Losartan 50 mg oral tablet [Take 1 tablet(s) by mouth daily], #90 (ninety) tablets, Refills: 1 (one)         Lab Orders:       [id number]  HTNLP - [facility] CMP AND LIPID: [id number]  (Send-Out)                      Plan:         Essential (primary)  hypertensionwill increase to 100 mg losartan.  Ask her to get BP ck here on our free Saturdays          Prescriptions:       [Refilled] Losartan 50 mg oral tablet [Take 1 tablet(s) by mouth daily], #90 (ninety) tablets, Refills: 1 (one)         Mixed hyperlipidemiaShe will return for lab work as she has somewhere she needs to get to now.    LABORATORY:  Labs ordered to be performed today include HTN/Lipid Panel: CMP, Lipid.            Orders:       [id number]  HTN - [facility] CMP AND LIPID: [id number]  (Send-Out)                  Charge Capture:         Primary Diagnosis:     I10  Essential (primary) hypertension           Orders:      [id number]  Office/outpatient visit; established patient, level 3  (In-House)              E78.2  Mixed hyperlipidemia

## 2021-05-18 NOTE — PROGRESS NOTES
Linda Mitchell  1950     Office/Outpatient Visit    Visit Date: Tue, Jun 23, 2020 09:06 am    Provider: Hudson Leon MD (Assistant: Spurling, Sarah C, MA)    Location: [facility]        Electronically signed by Hudson Leon MD on  06/23/2020 10:25:16 AM                             Subjective:        CC: Mrs. Mitchell is a 69 year old White female.  medicare wellness.;         HPI:           Mrs. Mitchell is here for a Medicare wellness visit.  The required HRA questions are integrated within this visit note. Family medical history and individual medical/surgical history were reviewed and updated.  A current height, weight, BMI, blood pressure, and pulse were recorded in the vitals section of the note and have been reviewed. Patient's medications, including supplements, were recorded in the chart and reviewed.  Current providers and suppliers were reviewed and updated.          Self-Assessment of Health: She rates her health as very good. She rates her confidence of being able to control/manage most of her health problems as very confident. Her physical/emotional health has limited her social activites not at all.  A review of cognitive impairment was performed, including ability to drive a car, manage finances, and any memory changes, and was found to be negative.  A review of functional ability, including bathing, dressing, walking, and urine/bowel continence as well as level of safety was performed and was found to be negative.  Falls Risk: Has not had any falls or only one fall without injury in the past year.  She denies having trouble hearing the TV/radio when others do not, having to strain to hear or understand conversations and wearing hearing aid(s).  Concerning home safety, she reports that at home she DOES have adequate lighting, a skid resistant shower/tub, handrails on stairs and functioning smoke alarms, but not grab bars in the bath or absence of throw rugs.  Physical  "Activity: She exercises for at least 20 minutes 3 or more days/week.; Type of diet patient normally eats is described as well-balanced with fruits and vegetables Tobacco: She has never smoked.   Preventative Health updated today           PHQ-9 Depression Screening: Completed form scanned and in chart; Total Score 1           Dx with essential (primary) hypertension; compliance with treatment has been good.  She is tolerating the medication well without side effects.  Mrs. Mitchell does not check her blood pressure other than at her clinic appointments.            Additionally, she presents with history of mixed hyperlipidemia.  compliance with treatment has been good.  She specifically denies associated symptoms, including muscle pain and weakness.        We reviewed the results of her last DEXA scan again.  She is not consistent with her vitamin D supplementation and calcium pills.          Additionally, she presents with history of atherosclerotic heart disease of native coronary artery without angina pectoris.  she is here today is not having any problems with chest pains or symptoms related to CAD..  She says she had follow-up visit with the cardiologist in December and he took her off of \"a pill\" sounds like it was may be Plavix.  We will send for copies of medical record      She has a prior history of anemia and would like to check a blood count when she gets follow-up blood work.  Also wants to see what her iron level is because she has had some fatigue.  Will throw and TSH as well.    ROS:     CONSTITUTIONAL:  Negative for chills, fatigue, fever, and weight change.      EYES:  Negative for blurred vision.      CARDIOVASCULAR:  Negative for chest pain, orthopnea, paroxysmal nocturnal dyspnea and pedal edema.      RESPIRATORY:  Negative for dyspnea.      GASTROINTESTINAL:  Negative for abdominal pain, constipation, diarrhea, nausea and vomiting.      GENITOURINARY:  Negative for dysuria and frequent urination. "      NEUROLOGICAL:  Negative for dizziness, headaches, paresthesias, and weakness.      PSYCHIATRIC:  Negative for anxiety, depression, and sleep disturbances.          Past Medical History / Family History / Social History:         Last Reviewed on 6/23/2020 09:52 AM by Hudson Leon    Past Medical History:                 PAST MEDICAL HISTORY         Coronary Artery Disease: dx'd in 11/2018;  she has had an angiogram;     Gastroesophageal Reflux Disease         CURRENT MEDICAL PROVIDERS:    Obstetrician/Gynecologist: dr. jacobsen             ADVANCED DIRECTIVES: None         PREVENTIVE HEALTH MAINTENANCE             BONE DENSITY: was last done 2019 some loss of calcium     COLORECTAL CANCER SCREENING: Up to date (colonoscopy q10y; sigmoidoscopy q5y; Cologuard q3y) was last done 2012; colonoscopy with normal results     DENTAL CLEANING: unsure     EYE EXAM: was last done Appt for July 2020     MAMMOGRAM: Done within last 2 years and results in are chart was last done 3-13-19 with normal results     PAP SMEAR: was last done march 2019 with normal results         PAST MEDICAL HISTORY                 ADVANCED DIRECTIVES: None         Surgical History:         COLONOSCOPY: was last done 2012     Cardiac Cath;         Family History:         Positive for Coronary Artery Disease ( mother; sister -- MI and required cardioversion ).          Social History: Lived in [place]. Natividad Naylor's niece.     Occupation:      Marital Status:      Children: None         Tobacco/Alcohol/Supplements:     Last Reviewed on 6/23/2020 09:08 AM by Spurling, Sarah C    Tobacco: She has never smoked.          Alcohol:  Does not drink alcohol and never has.          Substance Abuse History:     Last Reviewed on 11/12/2018 10:49 AM by Sahra Gay        Mental Health History:     Last Reviewed on 11/12/2018 10:49 AM by Sahra Gay        Communicable Diseases (eg STDs):     Last Reviewed on 11/12/2018  10:49 AM by Sahra Gay        Current Problems:     Last Reviewed on 6/23/2020 09:08 AM by Spurling, Sarah C    Essential (primary) hypertension    Mixed hyperlipidemia    Other specified disorders of bone density and structure, unspecified site    Other specified disorders of bone density and structure, multiple sites    Restless legs syndrome    Atherosclerotic heart disease of native coronary artery without angina pectoris    Anemia, unspecified    Actinic keratosis    Encounter for general adult medical examination without abnormal findings    Encounter for screening for depression        Allergies:     Last Reviewed on 6/23/2020 09:08 AM by Spurling, Sarah C    No Known Allergies.        Current Medications:     Last Reviewed on 6/23/2020 09:11 AM by Spurling, Sarah C    Vitamin E 1,000 unit oral capsule [1 capsule daily]    Calcium 600 600 mg calcium (1,500 mg) oral tablet [1 tab bid]    losartan 100 mg oral tablet [take 1 tablet (100 mg) by oral route once daily]    osteo biflex with vitamin d     Excedrin Migraine 250-250-65 mg oral tablet [2 tabs PRN]    Nexium 24HR 20 mg oral capsule,delayed release (enteric coated) [Take 1 capsule(s) by mouth daily]    OTC Pepcid prn      atorvastatin 80 mg oral tablet [Take one-half to one tablet by mouth at bedtime]    metoprolol tartrate 25mg Tablet [1/2 tab once daily]    ASA 81mg - Tablet one po daily      Iron 65mg x 2 a day     Ocutabs oral tablet        Objective:        Vitals:         Current: 6/23/2020 9:17:33 AM    Ht:  5 ft, 3 in;  Wt: 136.4 lbs;  BMI: 24.2T: 97.5 F (oral);  BP: 169/78 mm Hg (left arm, sitting);  P: 51 bpm (left arm (BP Cuff), sitting);  sCr: 0.85 mg/dL;  GFR: 59.95VA: 20/40 OD, 20/40 OS (near, without correction)        Repeat:     9:18:41 AM  BP:   173/81mm Hg (right arm, sitting, Second take, heart rate: 50)     Exams:     PHYSICAL EXAM:     GENERAL: well developed, well nourished;  well groomed;  no apparent distress;     EYES:  nonicteric;     E/N/T: EARS:  normal external auditory canals and tympanic membranes;  grossly normal hearing; OROPHARYNX:  normal mucosa, dentition, gingiva, and posterior pharynx;     NECK:  supple, full ROM; no thyromegaly; no carotid bruits;     RESPIRATORY: normal appearance and symmetric expansion of chest wall; normal respiratory rate and pattern with no distress; normal breath sounds with no rales, rhonchi, wheezes or rubs;     CARDIOVASCULAR: normal rate; rhythm is regular;  no systolic murmur;     LYMPHATIC: no supraclavicular nodes;     MUSCULOSKELETAL: no pedal edema;     NEUROLOGIC: no focal lateralizing deficits.;     PSYCHIATRIC:  appropriate affect and demeanor; normal speech pattern; grossly normal memory;         Assessment:         Z00.00   Encounter for general adult medical examination without abnormal findings       Z13.31   Encounter for screening for depression       I10   Essential (primary) hypertension       E78.2   Mixed hyperlipidemia       M85.89   Other specified disorders of bone density and structure, multiple sites       I25.10   Atherosclerotic heart disease of native coronary artery without angina pectoris       D64.9   Anemia, unspecified           ORDERS:         Meds Prescribed:       [New Rx] losartan-hydrochlorothiazide 100-12.5 mg oral tablet [take 1 tablet by oral route once daily], #90 (ninety) tablets, Refills: 0 (zero)         Lab Orders:       [id number]  HTNLP - [facility] CMP AND LIPID: [id number]  (Send-Out)            95914  VITD - [facility] Vitamin D, 25 Hydroxy  (Send-Out)            20264  BDCBC - [facility] CBC with 3 part diff  (Send-Out)            58708  IRONP - [facility] Iron and TIBC  (Send-Out)            76951  TSH - [facility] TSH  (Send-Out)              Other Orders:         Depression screen negative  (In-House)              Subsequent Annual Well Visit Medicare  (x1)                  Plan:         Encounter for general adult medical examination without abnormal findingsWe  reviewed the preventive service recommendations and created an individualized handout        Encounter for screening for depression    MIPS PHQ-9 Depression Screening: Completed form scanned and in chart; Total Score 1; Negative Depression Screen           Orders:         Depression screen negative  (In-House)              Essential (primary) hypertensionGo ahead and add 12 and half milligrams of HCTZ to her regimen and see if we get the blood pressure under little better control.Will get her to check BP at home.           Prescriptions:       [New Rx] losartan-hydrochlorothiazide 100-12.5 mg oral tablet [take 1 tablet by oral route once daily], #90 (ninety) tablets, Refills: 0 (zero)         Mixed hyperlipidemiaCheck labs predicate medication changes based on those results    LABORATORY:  Labs ordered to be performed today include HTN/Lipid Panel: CMP, Lipid.            Orders:       [id number]  HTNLP - [facility] CMP AND LIPID: [id number]  (Send-Out)              Other specified disorders of bone density and structure, multiple sites    LABORATORY:  Labs ordered to be performed today include Vitamin D.            Orders:       63435  VITD - [facility] Vitamin D, 25 Hydroxy  (Send-Out)              Atherosclerotic heart disease of native coronary artery without angina pectorisWe will send for copies of her cardiology note.  She is staying physically active        Anemia, unspecified    LABORATORY:  Labs ordered to be performed today include Anemia profile CBC Serum iron and TSH.            Orders:       [id number]  BDCBC - [facility] CBC with 3 part diff  (Send-Out)            81714  IRONP - [facility] Iron and TIBC  (Send-Out)            73853  TSH - [facility] TSH  (Send-Out)                  Other Patient Education Handouts:     Dragon transcription disclaimer:        Much of this encounter note is an electronic transcription/translation of spoken language to printed text.  The electronic translation of spoken language may permit erroneous, or at  times, nonsensical words or phrases to be inadvertently transcribed.  Although I have reviewed the note for such errors, some may still exist.        Charge Capture:         Primary Diagnosis:     Z00.00  Encounter for general adult medical examination without abnormal findings           Orders:      82567  Preventive medicine, established patient, age 65+ years  (In-House)              Subsequent Annual Well Visit Medicare  (x1)          Z13.31  Encounter for screening for depression           Orders:      04525-65  Office/outpatient visit; established patient, level 4  (In-House)              Depression screen negative  (In-House)              I10  Essential (primary) hypertension     E78.2  Mixed hyperlipidemia     M85.89  Other specified disorders of bone density and structure, multiple sites     I25.10  Atherosclerotic heart disease of native coronary artery without angina pectoris     D64.9  Anemia, unspecified

## 2021-05-18 NOTE — PROGRESS NOTES
Linda Mitchell 1950     Office/Outpatient Visit    Visit Date: Thu, Mar 29, 2018 10:18 am    Provider: Hudson Leon MD (Assistant: Brenda Cyr MA)    Location: [facility]        Electronically signed by Hudson Leon MD on  03/29/2018 11:41:18 PM                             SUBJECTIVE:        CC:     Ms. Mitchell is a 67 year old White female.  Medicare Wellness;         HPI:         Patient to be evaluated for hypertension.  She did not bring her blood pressure diary, but says that pressures have been okay.  She is tolerating the medication well without side effects.  Compliance with treatment has been good.  She has machine but doesn't use it much.         With regard to the hyperlipidemia, compliance with treatment has been good.  She specifically denies associated symptoms, including muscle pain and weakness.          Ms. Mitchell is here for a Medicare wellness visit.  Individual and family medical history was reviewed and updated A list of current providers and suppliers reviewed and updated A current height, weight, BMI, blood pressure, and pulse were recorded in the vitals section of the note and have been reviewed A review of cognitive impairment was performed and was negative.  A review of functional ability and level of safety was performed and was negative She denies having trouble hearing the TV/radio when others do not, having to strain to hear or understand conversations and wearing hearing aid(s).  Concerning home safety, she reports that at home she DOES have throw rugs and functioning smoke alarms, but not poor lighting, a slippery bath or shower, grab bars in the bath or handrails on stairs.      Physical Activity: Exercises at least 3 times per week; Has not had any falls or only one fall without injury in the past year.  Advance Care Directive updated today in [facility] Tobacco: She has never smoked.    Preventative Health updated today         She denies dissatisfaction  with her life, getting bored often, feeling helpless, preferring to stay at home rather than going out and doing new things and feeling worthless the way she is now.  Total score is 0     She did have mammo and Pap Smear in [place] in Feb.  It has been a couple years since she had DEXA though, and it did show some Osteopenia at that time evidently.  She has been on Calcium and Vit D.     ROS:     CONSTITUTIONAL:  Negative for chills, fatigue, fever, and weight change.      EYES:  Negative for blurred vision.      CARDIOVASCULAR:  Negative for chest pain, orthopnea, paroxysmal nocturnal dyspnea and pedal edema.      RESPIRATORY:  Negative for dyspnea.      GASTROINTESTINAL:  Negative for abdominal pain, constipation, diarrhea, nausea and vomiting.      GENITOURINARY:  Negative for dysuria and frequent urination.      NEUROLOGICAL:  Positive for dizziness ( one spell while doing an exercise routine every time moved head ).      PSYCHIATRIC:  Negative for anxiety, depression, and sleep disturbances.          PMH/FMH/SH:     Last Reviewed on 8/12/2017 09:32 AM by Kasandra Suarez    Past Medical History:                 PAST MEDICAL HISTORY         Gastroesophageal Reflux Disease         CURRENT MEDICAL PROVIDERS:    Obstetrician/Gynecologist: dr. jacobsen             ADVANCED DIRECTIVES: None         PREVENTIVE HEALTH MAINTENANCE             MAMMOGRAM: Done within last 2 years and results in are chart was last done 2/2017 with normal results     PAP SMEAR: was last done 2/2016 with normal results         PAST MEDICAL HISTORY                 ADVANCED DIRECTIVES: None         Surgical History:         COLONOSCOPY: was last done 2012         Family History:         Positive for Coronary Artery Disease ( mother ).          Social History: Lived in [place]. Natividad Naylor's niece.     Occupation:      Marital Status:      Children: None         Tobacco/Alcohol/Supplements:     Last Reviewed on 3/29/2018  10:25 AM by Brenda Cyr    Tobacco: She has never smoked.          Alcohol:  Does not drink alcohol and never has.          Substance Abuse History:     Last Reviewed on 8/12/2017 09:32 AM by Kasandra Suarez        Mental Health History:     Last Reviewed on 8/12/2017 09:32 AM by Kasandra Suarez        Communicable Diseases (eg STDs):     Last Reviewed on 8/12/2017 09:32 AM by Kasandra Suarez            Allergies:     Last Reviewed on 8/12/2017 09:32 AM by Kasandra Suarez      No Known Drug Allergies.         Current Medications:     Last Reviewed on 8/12/2017 09:32 AM by Kasandra Suarez    Losartan 50mg Tablet Take 1 tablet(s) by mouth daily     Pravastatin 20mg Tablet 1 tab q day hs     Nexium 24HR 20mg Capsules, Delayed Release Take 1 capsule(s) by mouth daily     Aleve 220mg Tablet 1 tab PRN     Excedrin Migraine Tablet 2 tabs bid     osteo biflex with vitamin d     Calcium 600 Tablet 1 tab bid     Vitamin E 1,000IU Capsules 1 capsule daily         OBJECTIVE:        Vitals:         Current: 3/29/2018 10:24:37 AM    Ht:  5 ft, 3 in;  Wt: 130.6 lbs;  BMI: 23.1    T: 97.1 F (oral);  BP: 157/76 mm Hg (right arm, sitting);  P: 55 bpm (right arm (BP Cuff), sitting);  sCr: 0.93 mg/dL;  GFR: 55.24        Exams:     PHYSICAL EXAM:     GENERAL: well developed, well nourished;  well groomed;  no apparent distress;     EYES: nonicteric;     E/N/T: EARS:  normal external auditory canals and tympanic membranes;  grossly normal hearing; OROPHARYNX:  normal mucosa, dentition, gingiva, and posterior pharynx;     NECK:  supple, full ROM; no thyromegaly; no carotid bruits;     RESPIRATORY: normal appearance and symmetric expansion of chest wall; normal respiratory rate and pattern with no distress; normal breath sounds with no rales, rhonchi, wheezes or rubs;     CARDIOVASCULAR: normal rate; rhythm is regular;  no systolic murmur;     LYMPHATIC: no enlargement of cervical or facial nodes; no supraclavicular  nodes;     MUSCULOSKELETAL: no pedal edema;     NEUROLOGIC: no focal lateralizing deficits.;     PSYCHIATRIC:  appropriate affect and demeanor; normal speech pattern; grossly normal memory;         Lab/Test Results:     AUDIO AND VISUAL SCREENING     Vision Testing: Near Right 20/25 without glasses Left 20/40 without glasses Bilateral 20/25 without glasses;         ASSESSMENT           401.1   I10  Hypertension              DDx:     272.4   E78.2  Hyperlipidemia              DDx:     V70.0   Z00.00  Health checkup              DDx:     V79.0   Z13.89  Screening for depression              DDx:     V03.82   Z23  Vaccination against pneumococcal pneumonia              DDx:     733.90   M85.80   M85.89  Osteopenia              DDx:         ORDERS:         Meds Prescribed:       Refill of: Losartan 50mg Tablet Take 1 tablet(s) by mouth daily  #90 (Ninety) tablet(s) Refills: 1       Refill of: Pravastatin 20mg Tablet 1 tab q day hs  #90 (Ninety) tablet(s) Refills: 1         Radiology/Test Orders:       [id number]  DXA, bone density study, 1 or more sites; axial skeleton (eg hips, pelvis, spine)  (Send-Out)           Lab Orders:       [id number]  [id number] [facility] Comp Metabolic Panel (14)  (Send-Out)         [id number]  [id number] [facility] Lipid Panel (Excludes LDL/HDL ratio)  (Send-Out)         69572  [id number] [facility] CBC without diff  (Send-Out)           Procedures Ordered:       [id number]  Pneumococcal polysaccharide vaccine, 23-valent, adult or immunosuppressed patient dosage, for use in  (In-House)           Other Orders:         Depression screen negative  (In-House)                   PLAN:          Hypertension Cont Rx.  Ask her to check BP here on our free Saturdays.   She can bring her machine to check against out readings.           Prescriptions:       Refill of: Losartan 50mg Tablet Take 1 tablet(s) by mouth daily  #90 (Ninety) tablet(s) Refills: 1       Refill of: Pravastatin 20mg Tablet 1 tab q day hs  #90 (Ninety) tablet(s)  Refills: 1          Hyperlipidemia cont rx check lab          Health checkup Reviewed the preventive service recommendations and created individualized handout.      [facility]:  [facility].  CMP Lipid panel           Orders:       [id number]  [id number] [facility] Comp Metabolic Panel (14)  (Send-Out)         [id number]  [id number] [facility] Lipid Panel (Excludes LDL/HDL ratio)  (Send-Out)         20456  [id number] [facility] CBC without diff  (Send-Out)            Screening for depression     MIPS Negative Depression Screen           Orders:         Depression screen negative  (In-House)            Vaccination against pneumococcal pneumonia         IMMUNIZATIONS given today: Pneumovax.            Orders:       [id number]  Pneumococcal polysaccharide vaccine, 23-valent, adult or immunosuppressed patient dosage, for use in  (In-House)            Osteopenia         FOLLOW-UP TESTING #1:    RADIOLOGY:  I have ordered Dexa Scan to be done today.            Orders:       [id number]  DXA, bone density study, 1 or more sites; axial skeleton (eg hips, pelvis, spine)  (Send-Out)               CHARGE CAPTURE           **Please note: ICD descriptions below are intended for billing purposes only and may not represent clinical diagnoses**        Primary Diagnosis:         401.1 Hypertension            I10    Essential (primary) hypertension              Orders:          [id number] -25  Office/outpatient visit; established patient, level 3  (In-House)           272.4 Hyperlipidemia            E78.2    Mixed hyperlipidemia    V70.0 Health checkup            Z00.00    Encounter for general adult medical examination without abnormal findings              Orders:          89269   Preventive medicine, established patient, age 65+ years  (In-House)           V79.0 Screening for depression            Z13.89    Encounter for screening for other disorder              Orders:             Depression screen negative   (In-House)           V03.82 Vaccination against pneumococcal pneumonia            Z23    Encounter for immunization              Orders:          44458   Pneumococcal polysaccharide vaccine, 23-valent, adult or immunosuppressed patient dosage, for use in  (In-House)           733.90 Osteopenia            M85.80    Other specified disorders of bone density and structure, unspecified site           M85.89    Other specified disorders of bone density and structure, multiple sites        ADDENDUMS:      ____________________________________    Addendum: 04/02/2018 08:59 SHERWIN - Brenda Cyr        Patient has not received her pneumovax 23 shot

## 2021-05-18 NOTE — PROGRESS NOTES
Linda Mitchell  1950     Office/Outpatient Visit    Visit Date: Mon, Jan 18, 2021 09:22 am    Provider: Hudson Leon MD (Assistant: Amina Chiang MA)    Location: [facility]        Electronically signed by Hudson Leon MD on  01/22/2021 07:53:28 AM                             Subjective:        CC: Mrs. Mitchell is a 70 year old White female.  This is a follow-up visit.  pt is unsure if she wants to get flu shot/al pt states she is not taking aspirin, calcium, osteo biflex        HPI:       Here for general follow-up.  We reviewed her lab work from last visit which was quite good.  She continues to take her iron supplement but not on a daily basis anymore. In regards to her coronary artery disease she says that she saw the cardiologist in September (we do not have a copy of that note yet) and reported to him some chest discomfort with exertion so he put her back on Plavix.  She has not had exertional chest pain lately.    Does state that she would like to return to dermatology for another skin check.  She had been treated by dermatology in [place] in the past for possible skin cancer.  She would like to see the same practitioner the took care of her .              Concerning mixed hyperlipidemia, compliance with treatment has been good.  She specifically denies associated symptoms, including muscle pain and weakness.        She does have a history of slight anemia and has had a slight elevation in creatinine in the past.  She is she likes keeping tabs on would like to have labs drawn.    ROS:     CONSTITUTIONAL:  Negative for chills, fatigue, fever, and weight change.      EYES:  Negative for blurred vision.      CARDIOVASCULAR:  Negative for chest pain, orthopnea, paroxysmal nocturnal dyspnea and pedal edema.      RESPIRATORY:  Negative for dyspnea.      GASTROINTESTINAL:  Positive for heartburn.   Negative for abdominal pain, constipation, diarrhea, nausea or vomiting.       GENITOURINARY:  Negative for dysuria and frequent urination.      NEUROLOGICAL:  Positive for headaches.      PSYCHIATRIC:  Negative for anxiety, depression, and sleep disturbances.          Past Medical History / Family History / Social History:         Last Reviewed on 1/18/2021 09:42 AM by Hudson Leon    Past Medical History:                 PAST MEDICAL HISTORY         Coronary Artery Disease: dx'd in 11/2018;  she has had an angiogram;     Gastroesophageal Reflux Disease         CURRENT MEDICAL PROVIDERS:    Cardiologist: [place]    Obstetrician/Gynecologist: dr. jacobsen             ADVANCED DIRECTIVES: None         PREVENTIVE HEALTH MAINTENANCE             BONE DENSITY: was last done 2019 some loss of calcium     COLORECTAL CANCER SCREENING: Up to date (colonoscopy q10y; sigmoidoscopy q5y; Cologuard q3y) was last done 2012; colonoscopy with normal results     DENTAL CLEANING: unsure     EYE EXAM: was last done Appt for July 2020     MAMMOGRAM: Done within last 2 years and results in are chart was last done 3-13-19 with normal results     PAP SMEAR: was last done march 2019 with normal results         PAST MEDICAL HISTORY                 ADVANCED DIRECTIVES: None         Surgical History:         COLONOSCOPY: was last done 2012     Cardiac Cath;         Family History:         Positive for Coronary Artery Disease ( mother; sister -- MI and required cardioversion ).          Social History: Lived in [place]. Natividad Naylor's niece.     Occupation:      Marital Status:      Children: None         Tobacco/Alcohol/Supplements:     Last Reviewed on 1/18/2021 09:29 AM by Amina Chiang    Tobacco: She has never smoked.          Alcohol:  Does not drink alcohol and never has.          Substance Abuse History:     Last Reviewed on 11/12/2018 10:49 AM by Sahra Gay        Mental Health History:     Last Reviewed on 11/12/2018 10:49 AM by Sahra Gay        Communicable Diseases  (eg STDs):     Last Reviewed on 11/12/2018 10:49 AM by Sahra Gay        Allergies:     Last Reviewed on 1/18/2021 09:29 AM by Amina Chiang    No Known Allergies.        Current Medications:     Last Reviewed on 1/18/2021 09:29 AM by Amnia Chiang    Ocutabs oral tablet    Vitamin E 1,000 unit oral capsule [1 capsule daily]    Calcium 600 600 mg calcium (1,500 mg) oral tablet [1 tab bid]    osteo biflex with vitamin d     Excedrin Migraine 250-250-65 mg oral tablet [2 tabs PRN]    Nexium 24HR 20 mg oral capsule,delayed release (enteric coated) [Take 1 capsule(s) by mouth daily]    atorvastatin 80 mg oral tablet [Take one-half to one tablet by mouth at bedtime]    OTC Pepcid prn      metoprolol tartrate 25mg Tablet [1/2 tab once daily]    ASA 81mg - Tablet one po daily      Iron 65mg x 2 a day     losartan-hydrochlorothiazide 100-12.5 mg oral tablet [TAKE 1 TABLET BY MOUTH EVERY DAY]    CLOPIDOGREL 75MG TABLETS [TAKE 1 TABLET BY MOUTH EVERY DAY]        Objective:        Vitals:         Current: 1/18/2021 9:31:19 AM    Ht:  5 ft, 3 in;  Wt: 135.6 lbs;  BMI: 24.0T: 97.4 F (temporal);  BP: 148/61 mm Hg (left arm, sitting);  P: 53 bpm (left arm (BP Cuff), sitting);  sCr: 0.96 mg/dL;  GFR: 52.24        Exams:     PHYSICAL EXAM:     GENERAL: well developed, well nourished;  well groomed;  no apparent distress;     EYES: nonicteric;     E/N/T: EARS:  normal external auditory canals and tympanic membranes;  grossly normal hearing; OROPHARYNX:  normal mucosa, dentition, gingiva, and posterior pharynx;     NECK:  supple, full ROM; no thyromegaly; no carotid bruits;     RESPIRATORY: normal appearance and symmetric expansion of chest wall; normal respiratory rate and pattern with no distress; normal breath sounds with no rales, rhonchi, wheezes or rubs;     CARDIOVASCULAR: normal rate; rhythm is regular;  no systolic murmur;     LYMPHATIC: no supraclavicular nodes;     MUSCULOSKELETAL: no pedal edema;     NEUROLOGIC: no  focal lateralizing deficits.;     PSYCHIATRIC:  appropriate affect and demeanor; normal speech pattern; grossly normal memory;         Assessment:         I10   Essential (primary) hypertension       E78.2   Mixed hyperlipidemia       M85.80   Other specified disorders of bone density and structure, unspecified site       D64.9   Anemia, unspecified       I25.10   Atherosclerotic heart disease of native coronary artery without angina pectoris       Z23   Encounter for immunization       D49.2   Neoplasm of unspecified behavior of bone, soft tissue, and skin           ORDERS:         Lab Orders:       [id number]  HTNLP - [facility] CMP AND LIPID: 95691, 42538  (Send-Out)            30308  BDCBC - [facility] CBC with 3 part diff  (Send-Out)            24086  IRONP - [facility] Iron and TIBC  (Send-Out)            28459  VITD - [facility] Vitamin D, 25 Hydroxy  (Send-Out)              Procedures Ordered:       REFER  Referral to Specialist or Other Facility  (Send-Out)            19827  Fluzone High Dose  (In-House)              Other Orders:         Administration of influenza virus vaccine  (x1)                  Plan:         Essential (primary) hypertensionContinuing current regimen.  We will review cardiology notes what blood pressure was there        Mixed hyperlipidemiaCheck lab predicate medication change based on those results    LABORATORY:  Labs ordered to be performed today include HTN/Lipid Panel: CMP, Lipid.            Orders:       [id number]  HTNLP - [facility] CMP AND LIPID: 18110, 70059  (Send-Out)              Other specified disorders of bone density and structure, unspecified site    LABORATORY:  Labs ordered to be performed today include Vitamin D.            Orders:       31971  VITD - [facility] Vitamin D, 25 Hydroxy  (Send-Out)              Anemia, unspecified    LABORATORY:  Labs ordered to be performed today include Anemia profile CBC Serum iron.            Orders:       88082  BDCBC - [facility] CBC with 3 part diff  (Send-Out)            93381   IRONP - [facility] Iron and TIBC  (Send-Out)              Atherosclerotic heart disease of native coronary artery without angina pectorisWe will send for copy of records from the cardiologist        Encounter for immunization        IMMUNIZATIONS given today: Influenza HIGH Dose.            Immunizations:       94224  Fluzone High Dose  (In-House)                Dose (ml): 0.7  Site: left deltoid  Route: intramuscular  Administered by: Amina Chiang          : [employer]  Lot #: [id number]  Exp: 06/30/2021          NDC: [address]-45        Administration of influenza virus vaccine  (x1)          Neoplasm of unspecified behavior of bone, soft tissue, and skin        REFERRALS:  Referral initiated to a dermatologist ( to perform skin check and requests Honey Degroot ).            Orders:       REFER  Referral to Specialist or Other Facility  (Send-Out)                  Other Patient Education Handouts:     Dragon transcription disclaimer:        Much of this encounter note is an electronic transcription/translation of spoken language to printed text.  The electronic translation of spoken language may permit erroneous, or at times, nonsensical words or phrases to be inadvertently transcribed.  Although I have reviewed the note for such errors, some may still exist.        Charge Capture:         Primary Diagnosis:     I10  Essential (primary) hypertension           Orders:      76159  Office/outpatient visit; established patient, level 4  (In-House)              E78.2  Mixed hyperlipidemia     M85.80  Other specified disorders of bone density and structure, unspecified site     D64.9  Anemia, unspecified     I25.10  Atherosclerotic heart disease of native coronary artery without angina pectoris     Z23  Encounter for immunization           Orders:      85831  Fluzone High Dose  (In-House)              Administration of influenza virus vaccine  (x1)          D49.2  Neoplasm of unspecified behavior of  bone, soft tissue, and skin

## 2021-05-18 NOTE — PROGRESS NOTES
Linda Mitchell 1950     Office/Outpatient Visit    Visit Date: Fri, Jun 14, 2019 09:33 am    Provider: Hudson Leon MD (Assistant: Sarah Spurling, MA)    Location: [facility]        Electronically signed by Hudson Leon MD on  06/14/2019 10:35:27 AM                             SUBJECTIVE:        CC:     Mrs. Mitchell is a 68 year old White female.  Medicare wellness.;         HPI:         Mrs. Mitchell is here for a Medicare wellness visit.  The required HRA questions are integrated within this visit note. Family medical history and individual medical/surgical history were reviewed and updated.  A current height, weight, BMI, blood pressure, and pulse were recorded in the vitals section of the note and have been reviewed. Patient's medications, including supplements, were recorded in the chart and reviewed.  Current providers and suppliers were reviewed and updated.          Self-Assessment of Health: She rates her health as good. She rates her confidence of being able to control/manage most of her health problems as very confident. Her physical/emotional health has limited her social activites not at all.  A review of functional ability, including bathing, dressing, walking, and urine/bowel continence as well as level of safety was performed and was found to be negative.  Falls Risk: Has not had any falls or only one fall without injury in the past year.  She denies having trouble hearing the TV/radio when others do not, having to strain to hear or understand conversations and wearing hearing aid(s).  Concerning home safety, she reports that at home she DOES have adequate lighting, a skid resistant shower/tub and functioning smoke alarms, but not grab bars in the bath, handrails on stairs or absence of throw rugs.  Physical Activity: Does outside work.; Type of diet patient normally eats is described as poor--needs improvement.; She eats TV dinners.  Tobacco: She has never smoked.     [facility] updated today         In regard to the hypertension, she did not bring her blood pressure diary, but says that pressures have been okay.  She says systolic blood pressures run in the 120s at home and that she just forgot to take her blood pressure medication last night.  I did point out that the last time she was here her blood pressure was in the 140s as well.  So I am going to ask her to come back in for the free blood pressure check and bring her machine to check readings against her machine She is tolerating the medication well without side effects.  Compliance with treatment has been good.          Additionally, she presents with history of cAD.  she is here today is not having any problems with chest pains or symptoms related to CAD..  We have not received records from her cardiologist.  She cannot remember his name.  She thinks it was the same doctor who did her cardiac cath so we will send to that physician for copy of office records.  Otherwise she says she has a follow-up appointment sometime next couple months and she will asked them to send us records at that time.     We did review her previous labs and her anemia iron deficiency have improved significantly.     She also tells me that she has had some kind of skin lesion on her nose in the past had to have some kind of special treatment by the dermatologist thinks it might be returning so she like to follow-up with dermatology again.     Review of her records show she has had osteopenia in the past.  She did not get repeat bone density scheduled due to her busy life but is willing to get that done now.     ROS:     CONSTITUTIONAL:  Negative for chills, fatigue, fever, and weight change.      EYES:  Negative for blurred vision.      CARDIOVASCULAR:  Negative for chest pain, orthopnea, paroxysmal nocturnal dyspnea and pedal edema.      RESPIRATORY:  Negative for dyspnea.      GASTROINTESTINAL:  Negative for abdominal pain,  constipation, diarrhea, nausea and vomiting.      GENITOURINARY:  Negative for dysuria and frequent urination.      NEUROLOGICAL:  Negative for dizziness, headaches, paresthesias, and weakness.      PSYCHIATRIC:  Negative for anxiety, depression, and sleep disturbances.          PMH/FMH/SH:     Last Reviewed on 6/14/2019 10:06 AM by Hudson Leon    Past Medical History:                 PAST MEDICAL HISTORY         Coronary Artery Disease: dx'd in 11/2019;  she has had an angiogram;     Gastroesophageal Reflux Disease         CURRENT MEDICAL PROVIDERS:    Obstetrician/Gynecologist: dr. jacobsen             ADVANCED DIRECTIVES: None         PREVENTIVE HEALTH MAINTENANCE             BONE DENSITY: was last done 2016 osteopenic     COLORECTAL CANCER SCREENING: Up to date (colonoscopy q10y; sigmoidoscopy q5y; Cologuard q3y) was last done 2012; colonoscopy with normal results     DENTAL CLEANING: unsure     EYE EXAM: was last done 2017     MAMMOGRAM: Done within last 2 years and results in are chart was last done 3-13-19 with normal results     PAP SMEAR: was last done march 2019 with normal results         PAST MEDICAL HISTORY                 ADVANCED DIRECTIVES: None         Surgical History:         COLONOSCOPY: was last done 2012     Cardiac Cath;         Family History:         Positive for Coronary Artery Disease ( mother; sister -- MI and required cardioversion ).          Social History: Lived in [place]. Natividad Naylor's niece.     Occupation:      Marital Status:      Children: None         Tobacco/Alcohol/Supplements:     Last Reviewed on 6/14/2019 09:37 AM by Spurling, Sarah C    Tobacco: She has never smoked.          Alcohol:  Does not drink alcohol and never has.          Substance Abuse History:     Last Reviewed on 11/12/2018 10:49 AM by Sahra Gay        Mental Health History:     Last Reviewed on 11/12/2018 10:49 AM by Sahra Gay        Communicable Diseases (eg  STDs):     Last Reviewed on 11/12/2018 10:49 AM by Sahra Gay            Allergies:     Last Reviewed on 6/14/2019 09:37 AM by Spurling, Sarah C      No Known Drug Allergies.         Current Medications:     Last Reviewed on 6/14/2019 09:42 AM by Spurling, Sarah C    Losartan 50mg Tablet Take 1 tablet(s) by mouth daily     Atorvastatin Calcium 80mg Tablet Take one tablet by mouth at bedtime     ASA 81mg - Tablet one po daily     Vitamin E     Clopidogrel 75mg Tablet 1 tab daily     Metoprolol Tartrate 25mg Tablet 1/2 tab 2x daily     OTC Pepcid prn     Nexium 24HR 20mg Capsules, Delayed Release Take 1 capsule(s) by mouth daily     Excedrin Migraine Tablet 2 tabs PRN     osteo biflex with vitamin d     Calcium 600 Tablet 1 tab bid     Vitamin E 1,000IU Capsules 1 capsule daily     Iron 65mg x 2 a day     Tylenol 500 PRN         OBJECTIVE:        Vitals:         Current: 6/14/2019 9:48:20 AM    Ht:  5 ft, 3 in;  Wt: 133.4 lbs;  BMI: 23.6    T: 97.6 F (oral);  BP: 145/56 mm Hg (left arm, sitting);  P: 54 bpm (left arm (BP Cuff), sitting);  sCr: 0.99 mg/dL;  GFR: 51.67    VA: 20/30 OD, 20/70 OS (near, without correction)        Repeat:     9:49:05 AM     BP:   147/63mm Hg (right arm, sitting, second take.)     9:50:53 AM     VA:    (20/30 OD,  (near, without correction, , 20/70 OS, , both eyes 20/30))         Exams:     PHYSICAL EXAM:     GENERAL: well developed, well nourished;  well groomed;  no apparent distress;     EYES: nonicteric;     E/N/T: EARS:  normal external auditory canals and tympanic membranes;  grossly normal hearing; OROPHARYNX:  normal mucosa, dentition, gingiva, and posterior pharynx;     NECK:  supple, full ROM; no thyromegaly; no carotid bruits;     RESPIRATORY: normal appearance and symmetric expansion of chest wall; normal respiratory rate and pattern with no distress; normal breath sounds with no rales, rhonchi, wheezes or rubs;     CARDIOVASCULAR: normal rate; rhythm is regular;  no  systolic murmur;     LYMPHATIC: no enlargement of cervical or facial nodes; no supraclavicular nodes;     MUSCULOSKELETAL: no pedal edema;     NEUROLOGIC: no focal lateralizing deficits.;     PSYCHIATRIC:  appropriate affect and demeanor; normal speech pattern; grossly normal memory;         ASSESSMENT           V70.0   Z00.00  Health checkup              DDx:     401.1   I10  Hypertension              DDx:     414.9   I25.10  CAD              DDx:     285.9   D64.9  Anemia, unspecified              DDx:     702.0   L57.0  Actinic keratosis              DDx:     733.90   M85.89  Osteopenia              DDx:         ORDERS:         Radiology/Test Orders:       38270  DXA, bone density study, 1 or more sites; axial skeleton (eg hips, pelvis, spine)  (Send-Out)           Procedures Ordered:       REFER  Referral to Specialist or Other Facility  (Send-Out)           Other Orders:         Subsequent Annual Well Visit Medicare (x1)                 PLAN:          Health checkup We reviewed the preventive service recommendations and created an individualized handout Suggested she inquire about shingles vaccine and Tdap at the pharmacy.  She is also due for her second hepatitis A vaccine.          Hypertension Ask her to come in for blood pressure check and bring her machine to check against ours.          CAD We need to get copies of her cardiologist records.          Anemia, unspecified Anemia has resolved          Actinic keratosis We will go ahead and get her and see the dermatologist for follow-up regarding her nasal lesion.         REFERRALS:  Referral initiated to a dermatologist ( Dr. Nita Barraza ).            Orders:       REFER  Referral to Specialist or Other Facility  (Send-Out)            Osteopenia         FOLLOW-UP TESTING #1:    RADIOLOGY:  I have ordered Dexa Scan to be done today.            Orders:       58913  DXA, bone density study, 1 or more sites; axial skeleton (eg hips, pelvis, spine)   (Send-Out)               Patient Recommendations:    Dragon transcription disclaimer:        Much of this encounter note is an electronic transcription/translation of spoken language to printed text.  The electronic translation of spoken language may permit erroneous, or at times, nonsensical words or phrases to be inadvertently transcribed.  Although I have reviewed the note for such errors, some may still exist.             CHARGE CAPTURE           **Please note: ICD descriptions below are intended for billing purposes only and may not represent clinical diagnoses**        Primary Diagnosis:         V70.0 Health checkup            Z00.00    Encounter for general adult medical examination without abnormal findings              Orders:          82118   Preventive medicine, established patient, age 65+ years  (In-House)                                           Subsequent Annual Well Visit Medicare (x1)         401.1 Hypertension            I10    Essential (primary) hypertension              Orders:          [id number] -25  Office/outpatient visit; established patient, level 4  (In-House)           414.9 CAD            I25.10    Atherosclerotic heart disease of native coronary artery without angina pectoris    285.9 Anemia, unspecified            D64.9    Anemia, unspecified    702.0 Actinic keratosis            L57.0    Actinic keratosis    733.90 Osteopenia            M85.89    Other specified disorders of bone density and structure, multiple sites

## 2021-05-18 NOTE — PROGRESS NOTES
Linda Mitchell 1950     Office/Outpatient Visit    Visit Date: Wed, Mar 13, 2019 09:47 am    Provider: Chelsea Wiseman N.P. (Assistant: Sarah Spurling, MA)    Location: [facility]        Electronically signed by Chelsea Wiseman N.P. on  03/13/2019 08:12:33 PM                             SUBJECTIVE:        CC:     Mrs. Mitchell is a 68 year old White female.  She is here for an annual exam.          HPI:         Her last gynecological exam was one year ago.  She cannot recall the date of her last normal period.  Her last Pap smear was 2016 years ago and was normal.  Her last mammogram was in Just had one this morning..  DEXA scan on over two years ago.  She performs breast self-exams occasionally.  She is not currently using any form of contraception.              PHQ-9 Depression Screening: Completed form scanned and in chart; Total Score 1 Alcohol Consumption Screening: Completed form scanned and in chart; Total Score 0     ROS:     CONSTITUTIONAL:  Negative for chills and fever.      EYES:  Negative for blurred vision and eye drainage.      E/N/T:  Negative for nasal congestion and sore throat.      CARDIOVASCULAR:  Negative for chest pain and palpitations.      RESPIRATORY:  Negative for chronic cough and dyspnea.      GASTROINTESTINAL:  Negative for abdominal pain, constipation, diarrhea, nausea and vomiting.      GENITOURINARY:  Negative for dysuria and vaginal discharge.      MUSCULOSKELETAL:  Negative for arthralgias and myalgias.      INTEGUMENTARY/BREAST:  Negative for atypical mole(s) and rash.      NEUROLOGICAL:  Negative for paresthesias and weakness.      ENDOCRINE:  Negative for temperature intolerances and hot flashes.      PSYCHIATRIC:  Negative for anxiety and depression.          PMH/FMH/SH:     Last Reviewed on 11/12/2018 11:07 AM by Hudson Leon    Past Medical History:                 PAST MEDICAL HISTORY         Gastroesophageal Reflux Disease         CURRENT MEDICAL  PROVIDERS:    Obstetrician/Gynecologist: dr. jacobsen             ADVANCED DIRECTIVES: None         PREVENTIVE HEALTH MAINTENANCE             MAMMOGRAM: Done within last 2 years and results in are chart was last done 2/2017 with normal results     PAP SMEAR: was last done 2/2016 with normal results         PAST MEDICAL HISTORY                 ADVANCED DIRECTIVES: None         Surgical History:         COLONOSCOPY: was last done 2012     Cardiac Cath;         Family History:         Positive for Coronary Artery Disease ( mother; sister -- MI and required cardioversion ).          Social History: Lived in [place]. Natividad Naylor's niece.     Occupation:      Marital Status:      Children: None         Tobacco/Alcohol/Supplements:     Last Reviewed on 12/11/2018 08:48 AM by Ese Alaniz    Tobacco: She has never smoked.          Alcohol:  Does not drink alcohol and never has.          Substance Abuse History:     Last Reviewed on 11/12/2018 10:49 AM by Sahra Gay        Mental Health History:     Last Reviewed on 11/12/2018 10:49 AM by Sahra Gay        Communicable Diseases (eg STDs):     Last Reviewed on 11/12/2018 10:49 AM by Sahra Gay            Current Problems:     Last Reviewed on 11/12/2018 10:49 AM by Sahra Gay    Unspecified anemia     Restless legs syndrome (RLS)     CAD     Osteopenia     Fatigue     Hyperlipidemia     Heart murmur, undiagnosed     Essential hypertension     Hypertension     GERD         Immunizations:     Prevnar 13 (Pneumococcal PCV 13) 9/16/2016     Fluzone High-Dose pf (>=65 yr) 11/12/2018     PNEUMOVAX 23 (Pneumococcal PPV23) 11/12/2018         Allergies:     Last Reviewed on 12/11/2018 08:48 AM by Ese Alaniz      No Known Drug Allergies.         Current Medications:     Last Reviewed on 3/13/2019 09:57 AM by Spurling, Sarah C    Losartan 50mg Tablet Take 1 tablet(s) by mouth daily     Atorvastatin Calcium 80mg Tablet Take one  tablet by mouth at bedtime     Ferrous Sulfate 325mg Tablets Take 1 tablet(s) by mouth daily. Take with Orange Juice or Vitamin C Tablets would be best.     ASA 81mg - Tablet one po daily     Vitamin E     Clopidogrel 75mg Tablet 1 tab daily     Metoprolol Tartrate 25mg Tablet 1/2 tab 2x daily     OTC Pepcid prn     Nexium 24HR 20mg Capsules, Delayed Release Take 1 capsule(s) by mouth daily     Excedrin Migraine Tablet 2 tabs bid     osteo biflex with vitamin d     Calcium 600 Tablet 1 tab bid     Vitamin E 1,000IU Capsules 1 capsule daily     Pravastatin 20mg Tablet         OBJECTIVE:        Vitals:         Historical:     12/11/2018  BP:   127/74 mm Hg ( (left arm, , sitting, );)     12/11/2018  Wt:   128.2lbs        Current: 3/13/2019 10:02:58 AM    Ht:  5 ft, 3 in;  Wt: 131.2 lbs;  BMI: 23.2    T: 97.5 F (oral);  BP: 145/51 mm Hg (left arm, sitting);  P: 51 bpm (left arm (BP Cuff), sitting);  sCr: 0.98 mg/dL;  GFR: 51.83        Exams:     PHYSICAL EXAM:     GENERAL: vital signs recorded - well developed, well nourished;  no apparent distress;     RESPIRATORY: normal respiratory rate and pattern with no distress; normal breath sounds with no rales, rhonchi, wheezes or rubs;     CARDIOVASCULAR: normal rate; rhythm is regular;  no systolic murmur; no edema;     GASTROINTESTINAL: nontender; no organomegaly; rectal exam: declines;     GENITOURINARY:  normal appearance of external genitalia, urethra, and cervix; no cervical motion tenderness; no adnexal tenderness or masses on bimanual exam;     LYMPHATIC: no axillary adenopathy;     BREAST/INTEGUMENT: BREASTS: breast exam is normal without masses, skin changes, or nipple discharge; SKIN: no significant rashes or lesions; no suspicious moles;     MUSCULOSKELETAL:  Normal range of motion, strength and tone;     NEUROLOGIC: mental status: alert and oriented x 3; GROSSLY INTACT     PSYCHIATRIC:  appropriate affect and demeanor; normal speech pattern; grossly normal  memory;         ASSESSMENT:           V70.0     Well Woman Exam     V79.0   Z13.89  Screening for depression              DDx:         ORDERS:         Lab Orders:       38213  Cytopathology, slides, cervical or vaginal; manual screening under MD supervision  (Send-Out)         35846  Urinalysis, automated, without microscopy  (In-House)           Procedures Ordered:       [id number]  Handlg&/or convey of spec for TR office to lab  (In-House)           Other Orders:         Depression screen negative  (In-House)                   PLAN:          Well Woman Exam         COUNSELING was provided today regarding the following topics: healthy eating habits, regular exercise, breast self-exam, abnormal post-menopausal vaginal bleeding, and is aware that pap testing at this age is most often not recommended but since medicare coveres  q 2 yrs would like to get at least one more pap test.  had abnormal pap test in the 1970s.  declines bone density testing.      FOLLOW-UP: Schedule a follow-up visit in 12 months.          declines UA.  advise calcium and vitamin d supplement to help bone density due to previous osteopenia.            Orders:      [id number]  Cytopathology, slides, cervical or vaginal; manual screening under MD supervision  (Send-Out)         [id number]  Handlg&/or convey of spec for TR office to lab  (In-House)         [id number]  Urinalysis, automated, without microscopy  (In-House)            Screening for depression     MIPS PHQ-9 Depression Screening Completed form scanned and in chart; Total Score 1           Orders:         Depression screen negative  (In-House)               Patient Recommendations:        For  Well Woman Exam:         Limit dietary intake of fat (especially saturated fat) and cholesterol.  Eat a variety of foods, including plenty of fruits, vegetables, and grain containg fiber, limit fat intake to 30% of total calories. Balance caloric intake with energy expended.    Maintaining regular physical  activity is advised to help prevent heart disease, hypertension, diabetes, and obesity.    You should regularly examine your breasts, easily done while in the shower or with lotion.  Feel and look for differences in consistency from month to month, especially noting knots or lumps, changes in skin appearance, nipple retraction or discharge.    Unless you are taking estrogen replacement in a cyclical fashion, where you are expected to have a period every month, post-menopausal bleeding is not normal, and may signify a serious problem such as endometrial cancer. Call your doctor if this occurs!  Schedule a follow-up visit in 12 months.              CHARGE CAPTURE:           Primary Diagnosis:     V70.0    Well Woman Exam                Z00.00    Encounter for general adult medical examination without abnormal findings                       Orders:          [id number]   Preventive medicine, established patient, age 65+ years  (In-House)             47605   Handlg&/or convey of spec for TR office to lab  (In-House)             17074   Urinalysis, automated, without microscopy  (In-House)           V79.0 Screening for depression            Z13.89    Encounter for screening for other disorder              Orders:             Depression screen negative  (In-House)

## 2021-07-01 VITALS
HEIGHT: 63 IN | SYSTOLIC BLOOD PRESSURE: 145 MMHG | HEART RATE: 51 BPM | DIASTOLIC BLOOD PRESSURE: 51 MMHG | TEMPERATURE: 97.5 F | BODY MASS INDEX: 23.25 KG/M2 | WEIGHT: 131.2 LBS

## 2021-07-01 VITALS
BODY MASS INDEX: 22.71 KG/M2 | HEART RATE: 57 BPM | TEMPERATURE: 97.5 F | DIASTOLIC BLOOD PRESSURE: 74 MMHG | SYSTOLIC BLOOD PRESSURE: 127 MMHG | HEIGHT: 63 IN | WEIGHT: 128.2 LBS

## 2021-07-01 VITALS
DIASTOLIC BLOOD PRESSURE: 63 MMHG | WEIGHT: 133.4 LBS | HEART RATE: 54 BPM | BODY MASS INDEX: 23.64 KG/M2 | HEIGHT: 63 IN | SYSTOLIC BLOOD PRESSURE: 147 MMHG | TEMPERATURE: 97.6 F

## 2021-07-01 VITALS
SYSTOLIC BLOOD PRESSURE: 157 MMHG | HEART RATE: 55 BPM | HEIGHT: 63 IN | BODY MASS INDEX: 23.14 KG/M2 | DIASTOLIC BLOOD PRESSURE: 76 MMHG | WEIGHT: 130.6 LBS | TEMPERATURE: 97.1 F

## 2021-07-01 VITALS
TEMPERATURE: 97.7 F | BODY MASS INDEX: 23.39 KG/M2 | DIASTOLIC BLOOD PRESSURE: 41 MMHG | WEIGHT: 132 LBS | SYSTOLIC BLOOD PRESSURE: 121 MMHG | HEIGHT: 63 IN | HEART RATE: 57 BPM

## 2021-07-01 VITALS
TEMPERATURE: 97.6 F | HEART RATE: 50 BPM | DIASTOLIC BLOOD PRESSURE: 65 MMHG | HEIGHT: 63 IN | SYSTOLIC BLOOD PRESSURE: 148 MMHG | WEIGHT: 135.6 LBS | BODY MASS INDEX: 24.03 KG/M2

## 2021-07-02 VITALS
DIASTOLIC BLOOD PRESSURE: 81 MMHG | WEIGHT: 136.4 LBS | HEIGHT: 63 IN | SYSTOLIC BLOOD PRESSURE: 173 MMHG | HEART RATE: 51 BPM | TEMPERATURE: 97.5 F | BODY MASS INDEX: 24.17 KG/M2

## 2021-07-02 VITALS
WEIGHT: 135.6 LBS | HEART RATE: 53 BPM | TEMPERATURE: 97.4 F | BODY MASS INDEX: 24.03 KG/M2 | SYSTOLIC BLOOD PRESSURE: 148 MMHG | HEIGHT: 63 IN | DIASTOLIC BLOOD PRESSURE: 61 MMHG

## 2021-08-13 ENCOUNTER — LAB (OUTPATIENT)
Dept: LAB | Facility: HOSPITAL | Age: 71
End: 2021-08-13

## 2021-08-13 ENCOUNTER — OFFICE VISIT (OUTPATIENT)
Dept: FAMILY MEDICINE CLINIC | Age: 71
End: 2021-08-13

## 2021-08-13 VITALS
SYSTOLIC BLOOD PRESSURE: 159 MMHG | BODY MASS INDEX: 24.66 KG/M2 | DIASTOLIC BLOOD PRESSURE: 77 MMHG | HEIGHT: 63 IN | HEART RATE: 49 BPM | WEIGHT: 139.2 LBS | TEMPERATURE: 96.9 F

## 2021-08-13 DIAGNOSIS — Z00.00 MEDICARE ANNUAL WELLNESS VISIT, SUBSEQUENT: Primary | ICD-10-CM

## 2021-08-13 DIAGNOSIS — I10 HYPERTENSION, ESSENTIAL: ICD-10-CM

## 2021-08-13 DIAGNOSIS — Z12.31 ENCOUNTER FOR SCREENING MAMMOGRAM FOR MALIGNANT NEOPLASM OF BREAST: ICD-10-CM

## 2021-08-13 DIAGNOSIS — M85.89 OSTEOPENIA OF MULTIPLE SITES: ICD-10-CM

## 2021-08-13 DIAGNOSIS — Z12.11 COLON CANCER SCREENING: ICD-10-CM

## 2021-08-13 DIAGNOSIS — D50.9 IRON DEFICIENCY ANEMIA, UNSPECIFIED IRON DEFICIENCY ANEMIA TYPE: ICD-10-CM

## 2021-08-13 DIAGNOSIS — I25.2 HISTORY OF NON-ST ELEVATION MYOCARDIAL INFARCTION (NSTEMI): ICD-10-CM

## 2021-08-13 DIAGNOSIS — L57.0 ACTINIC KERATOSES: ICD-10-CM

## 2021-08-13 DIAGNOSIS — E78.2 MIXED HYPERLIPIDEMIA: ICD-10-CM

## 2021-08-13 PROBLEM — Z12.39 ENCOUNTER FOR BREAST CANCER SCREENING USING NON-MAMMOGRAM MODALITY: Status: ACTIVE | Noted: 2021-08-13

## 2021-08-13 PROBLEM — D64.9 ANEMIA, UNSPECIFIED: Status: ACTIVE | Noted: 2021-08-13

## 2021-08-13 PROBLEM — I25.10 ATHEROSCLEROSIS OF NATIVE CORONARY ARTERY OF NATIVE HEART WITHOUT ANGINA PECTORIS: Status: ACTIVE | Noted: 2021-08-13

## 2021-08-13 LAB
ALBUMIN SERPL-MCNC: 4.2 G/DL (ref 3.5–5.2)
ALBUMIN/GLOB SERPL: 1.7 G/DL
ALP SERPL-CCNC: 77 U/L (ref 39–117)
ALT SERPL W P-5'-P-CCNC: 17 U/L (ref 1–33)
ANION GAP SERPL CALCULATED.3IONS-SCNC: 7 MMOL/L (ref 5–15)
AST SERPL-CCNC: 18 U/L (ref 1–32)
BASOPHILS # BLD AUTO: 0.03 10*3/MM3 (ref 0–0.2)
BASOPHILS NFR BLD AUTO: 0.6 % (ref 0–1.5)
BILIRUB SERPL-MCNC: 0.4 MG/DL (ref 0–1.2)
BUN SERPL-MCNC: 28 MG/DL (ref 8–23)
BUN/CREAT SERPL: 31.8 (ref 7–25)
CALCIUM SPEC-SCNC: 9 MG/DL (ref 8.6–10.5)
CHLORIDE SERPL-SCNC: 103 MMOL/L (ref 98–107)
CHOLEST SERPL-MCNC: 151 MG/DL (ref 0–200)
CO2 SERPL-SCNC: 27 MMOL/L (ref 22–29)
CREAT SERPL-MCNC: 0.88 MG/DL (ref 0.57–1)
DEPRECATED RDW RBC AUTO: 41.8 FL (ref 37–54)
EOSINOPHIL # BLD AUTO: 0.19 10*3/MM3 (ref 0–0.4)
EOSINOPHIL NFR BLD AUTO: 3.6 % (ref 0.3–6.2)
ERYTHROCYTE [DISTWIDTH] IN BLOOD BY AUTOMATED COUNT: 13.1 % (ref 12.3–15.4)
GFR SERPL CREATININE-BSD FRML MDRD: 64 ML/MIN/1.73
GLOBULIN UR ELPH-MCNC: 2.5 GM/DL
GLUCOSE SERPL-MCNC: 80 MG/DL (ref 65–99)
HCT VFR BLD AUTO: 36.7 % (ref 34–46.6)
HDLC SERPL-MCNC: 51 MG/DL (ref 40–60)
HGB BLD-MCNC: 12.3 G/DL (ref 12–15.9)
IMM GRANULOCYTES # BLD AUTO: 0.03 10*3/MM3 (ref 0–0.05)
IMM GRANULOCYTES NFR BLD AUTO: 0.6 % (ref 0–0.5)
LDLC SERPL CALC-MCNC: 75 MG/DL (ref 0–100)
LDLC/HDLC SERPL: 1.38 {RATIO}
LYMPHOCYTES # BLD AUTO: 1.51 10*3/MM3 (ref 0.7–3.1)
LYMPHOCYTES NFR BLD AUTO: 28.3 % (ref 19.6–45.3)
MCH RBC QN AUTO: 29.1 PG (ref 26.6–33)
MCHC RBC AUTO-ENTMCNC: 33.5 G/DL (ref 31.5–35.7)
MCV RBC AUTO: 86.8 FL (ref 79–97)
MONOCYTES # BLD AUTO: 0.57 10*3/MM3 (ref 0.1–0.9)
MONOCYTES NFR BLD AUTO: 10.7 % (ref 5–12)
NEUTROPHILS NFR BLD AUTO: 3.01 10*3/MM3 (ref 1.7–7)
NEUTROPHILS NFR BLD AUTO: 56.2 % (ref 42.7–76)
PLATELET # BLD AUTO: 162 10*3/MM3 (ref 140–450)
PMV BLD AUTO: 9.7 FL (ref 6–12)
POTASSIUM SERPL-SCNC: 3.9 MMOL/L (ref 3.5–5.2)
PROT SERPL-MCNC: 6.7 G/DL (ref 6–8.5)
RBC # BLD AUTO: 4.23 10*6/MM3 (ref 3.77–5.28)
SODIUM SERPL-SCNC: 137 MMOL/L (ref 136–145)
TRIGL SERPL-MCNC: 147 MG/DL (ref 0–150)
VLDLC SERPL-MCNC: 25 MG/DL (ref 5–40)
WBC # BLD AUTO: 5.34 10*3/MM3 (ref 3.4–10.8)

## 2021-08-13 PROCEDURE — 36415 COLL VENOUS BLD VENIPUNCTURE: CPT

## 2021-08-13 PROCEDURE — 99214 OFFICE O/P EST MOD 30 MIN: CPT | Performed by: FAMILY MEDICINE

## 2021-08-13 PROCEDURE — 80061 LIPID PANEL: CPT

## 2021-08-13 PROCEDURE — G0439 PPPS, SUBSEQ VISIT: HCPCS | Performed by: FAMILY MEDICINE

## 2021-08-13 PROCEDURE — 85025 COMPLETE CBC W/AUTO DIFF WBC: CPT

## 2021-08-13 PROCEDURE — 80053 COMPREHEN METABOLIC PANEL: CPT

## 2021-08-13 RX ORDER — ATORVASTATIN CALCIUM 80 MG/1
40 TABLET, FILM COATED ORAL DAILY
COMMUNITY
End: 2021-09-08 | Stop reason: SDUPTHER

## 2021-08-13 RX ORDER — LOSARTAN POTASSIUM AND HYDROCHLOROTHIAZIDE 12.5; 1 MG/1; MG/1
1 TABLET ORAL DAILY
COMMUNITY
Start: 2021-06-01 | End: 2021-08-13 | Stop reason: DRUGHIGH

## 2021-08-13 RX ORDER — FAMOTIDINE 10 MG
10 TABLET ORAL AS NEEDED
COMMUNITY

## 2021-08-13 RX ORDER — ASPIRIN 81 MG/1
81 TABLET ORAL DAILY
COMMUNITY

## 2021-08-13 RX ORDER — CLOPIDOGREL BISULFATE 75 MG/1
75 TABLET ORAL DAILY
COMMUNITY
Start: 2021-06-09

## 2021-08-13 RX ORDER — NAPROXEN SODIUM 220 MG
220 TABLET ORAL AS NEEDED
COMMUNITY
End: 2022-02-11

## 2021-08-13 RX ORDER — GLUCOSAMINE/D3/BOSWELLIA SERRA 1500MG-400
TABLET ORAL
COMMUNITY
End: 2022-02-11

## 2021-08-13 RX ORDER — LOSARTAN POTASSIUM AND HYDROCHLOROTHIAZIDE 25; 100 MG/1; MG/1
1 TABLET ORAL DAILY
Qty: 90 TABLET | Refills: 1 | Status: SHIPPED | OUTPATIENT
Start: 2021-08-13 | End: 2022-04-11 | Stop reason: SDUPTHER

## 2021-08-13 RX ORDER — ACETAMINOPHEN, ASPIRIN AND CAFFEINE 250; 250; 65 MG/1; MG/1; MG/1
1 TABLET, FILM COATED ORAL EVERY 6 HOURS PRN
COMMUNITY

## 2021-08-13 NOTE — ASSESSMENT & PLAN NOTE
Functionally she is doing well.  Continue with risk factor modification using current medications and lifestyle

## 2021-08-13 NOTE — PROGRESS NOTES
The ABCs of the Annual Wellness Visit  Subsequent Medicare Wellness Visit    Chief Complaint   Patient presents with   • Medicare Wellness-subsequent       Subjective   History of Present Illness:  Linda Mitchell is a 70 y.o. female who presents for a Subsequent Medicare Wellness Visit.    HEALTH RISK ASSESSMENT    Recent Hospitalizations:  No hospitalization(s) within the last year.    Current Medical Providers:  Patient Care Team:  Hudson Leon MD as PCP - General (Family Medicine)    Smoking Status:  Social History     Tobacco Use   Smoking Status Never Smoker   Smokeless Tobacco Never Used       Alcohol Consumption:  Social History     Substance and Sexual Activity   Alcohol Use Not Currently       Depression Screen:   PHQ-2/PHQ-9 Depression Screening 8/13/2021   Little interest or pleasure in doing things 0   Feeling down, depressed, or hopeless 0   Total Score 0       Fall Risk Screen:  STEADI Fall Risk Assessment was completed, and patient is at LOW risk for falls.Assessment completed on:8/13/2021    Health Habits and Functional and Cognitive Screening:  Functional & Cognitive Status 8/13/2021   Do you have difficulty preparing food and eating? No   Do you have difficulty bathing yourself, getting dressed or grooming yourself? No   Do you have difficulty using the toilet? No   Do you have difficulty moving around from place to place? No   Do you have trouble with steps or getting out of a bed or a chair? No   Current Diet Well Balanced Diet   Dental Exam Not up to date   Eye Exam Not up to date   Exercise (times per week) 6 times per week   Current Exercises Include Other;Gardening        Exercise Comment building a house   Do you need help using the phone?  No   Are you deaf or do you have serious difficulty hearing?  No   Do you need help with transportation? No   Do you need help shopping? No   Do you need help preparing meals?  No   Do you need help with housework?  No   Do you need help with  laundry? No   Do you need help taking your medications? No   Do you need help managing money? No   Do you ever drive or ride in a car without wearing a seat belt? No   Have you felt unusual stress, anger or loneliness in the last month? No   Who do you live with? Spouse   If you need help, do you have trouble finding someone available to you? No   Have you been bothered in the last four weeks by sexual problems? No   Do you have difficulty concentrating, remembering or making decisions? No         Does the patient have evidence of cognitive impairment? No    Asprin use counseling:Taking ASA appropriately as indicated    Age-appropriate Screening Schedule:  Refer to the list below for future screening recommendations based on patient's age, sex and/or medical conditions. Orders for these recommended tests are listed in the plan section. The patient has been provided with a written plan.    Health Maintenance   Topic Date Due   • URINE MICROALBUMIN  Never done   • TDAP/TD VACCINES (1 - Tdap) Never done   • ZOSTER VACCINE (1 of 2) Never done   • MAMMOGRAM  03/13/2021   • DXA SCAN  06/24/2021   • DIABETIC FOOT EXAM  Never done   • HEMOGLOBIN A1C  08/03/2021   • DIABETIC EYE EXAM  08/03/2021   • INFLUENZA VACCINE  10/01/2021   • LIPID PANEL  01/18/2022          The following portions of the patient's history were reviewed and updated as appropriate: allergies, current medications, past family history, past medical history, past social history, past surgical history and problem list.    Outpatient Medications Prior to Visit   Medication Sig Dispense Refill   • aspirin 81 MG EC tablet Take 81 mg by mouth Daily.     • aspirin-acetaminophen-caffeine (EXCEDRIN MIGRAINE) 250-250-65 MG per tablet Take 1 tablet by mouth Every 6 (Six) Hours As Needed for Headache.     • atorvastatin (LIPITOR) 80 MG tablet Take 40 mg by mouth Daily.     • Boswellia-Glucosamine-Vit D (Osteo Bi-Flex One Per Day) tablet Take  by mouth.     •  clopidogrel (PLAVIX) 75 MG tablet Take 75 mg by mouth Daily.     • esomeprazole (nexIUM) 20 MG capsule Take 20 mg by mouth Every Morning Before Breakfast.     • famotidine (PEPCID) 10 MG tablet Take 10 mg by mouth As Needed for Heartburn.     • metoprolol tartrate (LOPRESSOR) 25 MG tablet Take 25 mg by mouth Daily.     • naproxen sodium (ALEVE) 220 MG tablet Take 220 mg by mouth As Needed.     • Vitamin E 450 MG capsule Take  by mouth.     • losartan-hydrochlorothiazide (HYZAAR) 100-12.5 MG per tablet Take 1 tablet by mouth Daily.       No facility-administered medications prior to visit.       Patient Active Problem List   Diagnosis   • Atherosclerosis of native coronary artery of native heart without angina pectoris   • History of non-ST elevation myocardial infarction (NSTEMI)   • Hypertension, essential   • Mixed hyperlipidemia   • Anemia, unspecified   • Osteopenia of multiple sites   • Encounter for screening mammogram for malignant neoplasm of breast   • Colon cancer screening   • Medicare annual wellness visit, subsequent   • Actinic keratoses       Advanced Care Planning:  ACP discussion was held with the patient during this visit. Patient does not have an advance directive, information provided.    Review of Systems   Constitutional: Negative for chills, fatigue, fever and unexpected weight change.   Respiratory: Negative for cough and shortness of breath.    Cardiovascular: Negative for chest pain.   Gastrointestinal: Negative for abdominal pain, constipation, diarrhea, nausea and vomiting.   Genitourinary: Negative for dysuria, frequency and hematuria.   Musculoskeletal: Negative for arthralgias and myalgias.   Psychiatric/Behavioral: Negative for sleep disturbance. The patient is not nervous/anxious.        Compared to one year ago, the patient feels her physical health is the same.  Compared to one year ago, the patient feels her mental health is the same.    Reviewed chart for potential of high risk  "medication in the elderly: yes  Reviewed chart for potential of harmful drug interactions in the elderly:yes    Objective         Vitals:    08/13/21 0841 08/13/21 0852   BP: 165/72 159/77   Pulse: (!) 49 (!) 49   Temp: 96.9 °F (36.1 °C)    TempSrc: Oral    Weight: 63.1 kg (139 lb 3.2 oz)    Height: 160 cm (62.99\")        Body mass index is 24.66 kg/m².  Discussed the patient's BMI with her. The BMI is in the acceptable range.    Physical Exam  Vitals and nursing note reviewed.   Constitutional:       General: She is not in acute distress.     Appearance: Normal appearance. She is obese.   HENT:      Right Ear: Tympanic membrane and ear canal normal.      Left Ear: Tympanic membrane and ear canal normal.      Mouth/Throat:      Mouth: Mucous membranes are moist.      Pharynx: Oropharynx is clear. No oropharyngeal exudate.   Eyes:      General: No scleral icterus.     Conjunctiva/sclera: Conjunctivae normal.   Neck:      Vascular: No carotid bruit.   Cardiovascular:      Rate and Rhythm: Normal rate and regular rhythm.      Heart sounds: No murmur heard.   No friction rub. No gallop.    Pulmonary:      Effort: No respiratory distress.      Breath sounds: No wheezing or rales.   Chest:      Comments: She declines breast exam, but agrees to mammogram.    Abdominal:      General: Bowel sounds are normal.      Palpations: Abdomen is soft. There is no mass.      Tenderness: There is no abdominal tenderness. There is no guarding or rebound.   Musculoskeletal:         General: No swelling.      Right lower leg: No edema.      Left lower leg: No edema.   Lymphadenopathy:      Cervical: No cervical adenopathy.   Skin:     Coloration: Skin is not jaundiced.      Findings: No lesion.      Comments: She has a couple of slightly raised rough areas on her forearms    Neurological:      General: No focal deficit present.      Mental Status: She is alert and oriented to person, place, and time.   Psychiatric:         Mood and " Affect: Mood normal.         Behavior: Behavior normal.         Thought Content: Thought content normal.         Judgment: Judgment normal.               Assessment/Plan   Medicare Risks and Personalized Health Plan  CMS Preventative Services Quick Reference  Breast Cancer/Mammogram Screening  Colon Cancer Screening  Osteoporosis Risk    The above risks/problems have been discussed with the patient.  Pertinent information has been shared with the patient in the After Visit Summary.  Follow up plans and orders are seen below in the Assessment/Plan Section.    Diagnoses and all orders for this visit:    1. Medicare annual wellness visit, subsequent (Primary)  Assessment & Plan:  We reviewed the preventive service recommendations and created an individualized handout she declines breast exam.  Did look in her old records and she will be due for colonoscopy next year      2. Hypertension, essential  Assessment & Plan:  Blood pressure little higher than we would like to see.  I am going to increase her HCTZ to include losartan/HCTZ 100/25.  Asked her to monitor blood pressure at home periodic basis report to us if remains elevated    Orders:  -     losartan-hydrochlorothiazide (Hyzaar) 100-25 MG per tablet; Take 1 tablet by mouth Daily.  Dispense: 90 tablet; Refill: 1  -     Comprehensive Metabolic Panel; Future    3. Mixed hyperlipidemia  Assessment & Plan:  We will check lab predicate medication change based on results    Orders:  -     Lipid Panel; Future  -     Comprehensive Metabolic Panel; Future    4. History of non-ST elevation myocardial infarction (NSTEMI)  Assessment & Plan:  Functionally she is doing well.  Continue with risk factor modification using current medications and lifestyle      5. Iron deficiency anemia, unspecified iron deficiency anemia type  Assessment & Plan:  We will check lab follow-up    Orders:  -     CBC & Differential; Future    6. Colon cancer screening  Assessment & Plan:  Reviewed  prior medical records she will be due for follow-up colonoscopy next year      7. Osteopenia of multiple sites  Assessment & Plan:  Get follow-up DEXA scan    Orders:  -     DEXA Bone Density Axial; Future    8. Actinic keratoses  Assessment & Plan:  We discussed treatment options.  Will call back this winter for Efudex      9. Encounter for screening mammogram for malignant neoplasm of breast  Assessment & Plan:  She declines breast exam but agrees to mammogram    Orders:  -     Mammo Screening Digital Tomosynthesis Bilateral With CAD; Future    Follow Up:  No follow-ups on file.     An After Visit Summary and PPPS were given to the patient.

## 2021-08-13 NOTE — ASSESSMENT & PLAN NOTE
We reviewed the preventive service recommendations and created an individualized handout she declines breast exam.  Did look in her old records and she will be due for colonoscopy next year

## 2021-08-13 NOTE — ASSESSMENT & PLAN NOTE
Blood pressure little higher than we would like to see.  I am going to increase her HCTZ to include losartan/HCTZ 100/25.  Asked her to monitor blood pressure at home periodic basis report to us if remains elevated

## 2021-09-08 RX ORDER — ATORVASTATIN CALCIUM 80 MG/1
40 TABLET, FILM COATED ORAL DAILY
Qty: 45 TABLET | Refills: 1 | Status: SHIPPED | OUTPATIENT
Start: 2021-09-08 | End: 2021-12-13

## 2021-09-20 ENCOUNTER — HOSPITAL ENCOUNTER (OUTPATIENT)
Dept: BONE DENSITY | Facility: HOSPITAL | Age: 71
Discharge: HOME | End: 2021-09-20
Admitting: FAMILY MEDICINE

## 2021-09-20 ENCOUNTER — HOSPITAL ENCOUNTER (OUTPATIENT)
Dept: MAMMOGRAPHY | Facility: HOSPITAL | Age: 71
Discharge: HOME | End: 2021-09-20
Admitting: FAMILY MEDICINE

## 2021-09-20 DIAGNOSIS — M85.89 OSTEOPENIA OF MULTIPLE SITES: ICD-10-CM

## 2021-09-20 DIAGNOSIS — Z12.31 ENCOUNTER FOR SCREENING MAMMOGRAM FOR MALIGNANT NEOPLASM OF BREAST: ICD-10-CM

## 2021-09-20 PROCEDURE — 77063 BREAST TOMOSYNTHESIS BI: CPT

## 2021-09-20 PROCEDURE — 77067 SCR MAMMO BI INCL CAD: CPT

## 2021-09-20 PROCEDURE — 77080 DXA BONE DENSITY AXIAL: CPT

## 2021-12-13 RX ORDER — ATORVASTATIN CALCIUM 80 MG/1
TABLET, FILM COATED ORAL
Qty: 45 TABLET | Refills: 1 | Status: SHIPPED | OUTPATIENT
Start: 2021-12-13 | End: 2022-09-09

## 2022-02-11 ENCOUNTER — LAB (OUTPATIENT)
Dept: LAB | Facility: HOSPITAL | Age: 72
End: 2022-02-11

## 2022-02-11 ENCOUNTER — OFFICE VISIT (OUTPATIENT)
Dept: FAMILY MEDICINE CLINIC | Age: 72
End: 2022-02-11

## 2022-02-11 VITALS
HEART RATE: 45 BPM | HEIGHT: 63 IN | TEMPERATURE: 97.4 F | OXYGEN SATURATION: 99 % | SYSTOLIC BLOOD PRESSURE: 119 MMHG | BODY MASS INDEX: 24.98 KG/M2 | WEIGHT: 141 LBS | DIASTOLIC BLOOD PRESSURE: 61 MMHG

## 2022-02-11 DIAGNOSIS — M85.89 OSTEOPENIA OF MULTIPLE SITES: ICD-10-CM

## 2022-02-11 DIAGNOSIS — Z12.11 COLON CANCER SCREENING: ICD-10-CM

## 2022-02-11 DIAGNOSIS — I25.10 ATHEROSCLEROSIS OF NATIVE CORONARY ARTERY OF NATIVE HEART WITHOUT ANGINA PECTORIS: ICD-10-CM

## 2022-02-11 DIAGNOSIS — I73.00 RAYNAUD'S DISEASE WITHOUT GANGRENE: ICD-10-CM

## 2022-02-11 DIAGNOSIS — E78.2 MIXED HYPERLIPIDEMIA: ICD-10-CM

## 2022-02-11 DIAGNOSIS — Z72.9 PROBLEM RELATED TO LIFESTYLE: ICD-10-CM

## 2022-02-11 DIAGNOSIS — K21.9 GASTROESOPHAGEAL REFLUX DISEASE, UNSPECIFIED WHETHER ESOPHAGITIS PRESENT: ICD-10-CM

## 2022-02-11 DIAGNOSIS — I10 HYPERTENSION, ESSENTIAL: Primary | ICD-10-CM

## 2022-02-11 LAB
25(OH)D3 SERPL-MCNC: 38.1 NG/ML (ref 30–100)
ALBUMIN SERPL-MCNC: 4.4 G/DL (ref 3.5–5.2)
ALBUMIN/GLOB SERPL: 2.1 G/DL
ALP SERPL-CCNC: 76 U/L (ref 39–117)
ALT SERPL W P-5'-P-CCNC: 18 U/L (ref 1–33)
ANION GAP SERPL CALCULATED.3IONS-SCNC: 7.1 MMOL/L (ref 5–15)
AST SERPL-CCNC: 17 U/L (ref 1–32)
BILIRUB SERPL-MCNC: 0.3 MG/DL (ref 0–1.2)
BUN SERPL-MCNC: 31 MG/DL (ref 8–23)
BUN/CREAT SERPL: 24 (ref 7–25)
CALCIUM SPEC-SCNC: 9.4 MG/DL (ref 8.6–10.5)
CHLORIDE SERPL-SCNC: 103 MMOL/L (ref 98–107)
CHOLEST SERPL-MCNC: 166 MG/DL (ref 0–200)
CO2 SERPL-SCNC: 28.9 MMOL/L (ref 22–29)
CREAT SERPL-MCNC: 1.29 MG/DL (ref 0.57–1)
GFR SERPL CREATININE-BSD FRML MDRD: 41 ML/MIN/1.73
GLOBULIN UR ELPH-MCNC: 2.1 GM/DL
GLUCOSE SERPL-MCNC: 91 MG/DL (ref 65–99)
HCV AB SER DONR QL: NORMAL
HDLC SERPL-MCNC: 45 MG/DL (ref 40–60)
LDLC SERPL CALC-MCNC: 92 MG/DL (ref 0–100)
LDLC/HDLC SERPL: 1.93 {RATIO}
POTASSIUM SERPL-SCNC: 3.7 MMOL/L (ref 3.5–5.2)
PROT SERPL-MCNC: 6.5 G/DL (ref 6–8.5)
SODIUM SERPL-SCNC: 139 MMOL/L (ref 136–145)
TRIGL SERPL-MCNC: 170 MG/DL (ref 0–150)
VLDLC SERPL-MCNC: 29 MG/DL (ref 5–40)

## 2022-02-11 PROCEDURE — 80061 LIPID PANEL: CPT | Performed by: FAMILY MEDICINE

## 2022-02-11 PROCEDURE — 80053 COMPREHEN METABOLIC PANEL: CPT | Performed by: FAMILY MEDICINE

## 2022-02-11 PROCEDURE — 86803 HEPATITIS C AB TEST: CPT | Performed by: FAMILY MEDICINE

## 2022-02-11 PROCEDURE — 82306 VITAMIN D 25 HYDROXY: CPT | Performed by: FAMILY MEDICINE

## 2022-02-11 PROCEDURE — 99214 OFFICE O/P EST MOD 30 MIN: CPT | Performed by: FAMILY MEDICINE

## 2022-02-11 PROCEDURE — 36415 COLL VENOUS BLD VENIPUNCTURE: CPT | Performed by: FAMILY MEDICINE

## 2022-02-11 RX ORDER — ISOSORBIDE MONONITRATE 30 MG/1
TABLET, EXTENDED RELEASE ORAL
COMMUNITY
Start: 2021-12-03

## 2022-02-11 RX ORDER — PANTOPRAZOLE SODIUM 40 MG/1
40 TABLET, DELAYED RELEASE ORAL DAILY
Qty: 90 TABLET | Refills: 1 | Status: SHIPPED | OUTPATIENT
Start: 2022-02-11 | End: 2022-07-29

## 2022-02-11 NOTE — PROGRESS NOTES
"Chief Complaint  Follow-up (6 months)    Subjective          Linda Mitchell presents to [facility]  History of Present Illness    Ms. Mitchell an 71-year-old female who presents today for a wellness follow-up.  She is accompanied by her .    The patient denies monitoring her blood pressure at home. Her blood pressure medication was increased and she is currently on losartan HCTZ 100-25mg. She denies having abnormal side effects from the medication.    The patient reports she did have chest discomfort. The patient informed her cardiologist that she was experiencing fatigue at work, and he stated that stents were not placed for her blockage, and it may be advancing. She is currently taking isosorbide mononitrate once daily at nighttime, which helps with her fatigue. She still experiences fatigue, but it is not as severe as it was before she started the medication. The cardiologist also informed her that she may experience a headache the first week, and she states that she did, and after approximately 5 days the headaches went away.    The patient is taking atorvastatin 80mg daily for her cholesterol. She is tolerating the medication well. 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 She states that she is not supposed to take the naproxen often, and only takes it when her legs are so severe, she is unable to sleep. She notes taking Excedrin for her lower extremity pain which is helpful. She continues to take a high dosage Vitamin E because she had a breast cyst approximately 20 years ago, and was informed that Vitamin E would assist with it, and she has not had another cyst since. She does not want to discontinue the Vitamin E, and states she will continue to take it.    The patient reports that she is trying to limit spicy food in her diet, and she had a bad case of GERD last night. She states she " is currently taking Nexium, but it does not always work, so she will take famotidine and Tums as well. She may eat 3 to 4 times at a time. She denies having an endoscopy in the past.     The patient reports that her fingertips would turn white and get cold and numb, and that this does not occur often. This would last for approximately 10 minutes. She denies pain. She denies angina with exertion.     The patient states she has sleep apnea and snores; however, she declines utilizing a CPAP machine saying she would not tolerate a larger mask her  uses.  Did explain to her that there are other type of devices available.     She notes having had a recent bone density test.  We reviewed those results, and discussed implications.  With her reflux symptomatology we will hold off on consideration of bisphosphonates.    The patient has received her COVID-19 vaccination series as well as the booster; however, she did not receive her influenza vaccination. She will  not get it this year, but states she might in 2023.      Current Outpatient Medications   Medication Sig Dispense Refill   • aspirin 81 MG EC tablet Take 81 mg by mouth Daily.     • aspirin-acetaminophen-caffeine (EXCEDRIN MIGRAINE) 250-250-65 MG per tablet Take 1 tablet by mouth Every 6 (Six) Hours As Needed for Headache.     • atorvastatin (LIPITOR) 80 MG tablet TAKE 1/2 TABLET BY MOUTH DAILY 45 tablet 1   • clopidogrel (PLAVIX) 75 MG tablet Take 75 mg by mouth Daily.     • famotidine (PEPCID) 10 MG tablet Take 10 mg by mouth As Needed for Heartburn.     • isosorbide mononitrate (IMDUR) 30 MG 24 hr tablet      • losartan-hydrochlorothiazide (Hyzaar) 100-25 MG per tablet Take 1 tablet by mouth Daily. 90 tablet 1   • metoprolol tartrate (LOPRESSOR) 25 MG tablet Take 25 mg by mouth Daily.     • Vitamin E 450 MG capsule Take  by mouth.     • amLODIPine (NORVASC) 2.5 MG tablet Take 1 tablet by mouth Daily. 30 tablet 5   • pantoprazole (Protonix) 40 MG EC tablet  "Take 1 tablet by mouth Daily. 90 tablet 1     No current facility-administered medications for this visit.       Review of Systems   Constitutional: Negative for activity change, appetite change, chills, diaphoresis, fatigue, fever, unexpected weight gain and unexpected weight loss.   HENT: Negative for congestion, dental problem, ear pain, hearing loss, postnasal drip, rhinorrhea, sinus pressure, sneezing, sore throat, swollen glands, trouble swallowing and voice change.    Eyes: Negative for pain, discharge, redness, itching and visual disturbance.   Respiratory: Negative for cough, chest tightness, shortness of breath and wheezing.    Cardiovascular: Negative for chest pain, palpitations and leg swelling.   Gastrointestinal: Negative for abdominal pain, anal bleeding, blood in stool, constipation, diarrhea, nausea, vomiting and GERD.   Endocrine: Negative for cold intolerance, heat intolerance, polydipsia and polyphagia.   Genitourinary: Negative for decreased urine volume, difficulty urinating, dysuria, frequency, hematuria, urgency and urinary incontinence.   Musculoskeletal: Negative for arthralgias, back pain, gait problem, myalgias and neck pain.   Skin: Negative for rash, skin lesions and wound.   Allergic/Immunologic: Negative for environmental allergies, food allergies and immunocompromised state.   Neurological: Negative for dizziness, tremors, light-headedness, numbness, headache and memory problem.   Hematological: Negative for adenopathy. Does not bruise/bleed easily.   Psychiatric/Behavioral: Negative for decreased concentration, dysphoric mood, self-injury, sleep disturbance, suicidal ideas, negative for hyperactivity, depressed mood and stress. The patient is not nervous/anxious.             Objective   Vital Signs:   /61 (BP Location: Right arm, Patient Position: Sitting)   Pulse (!) 45   Temp 97.4 °F (36.3 °C) (Oral)   Ht 160 cm (62.99\")   Wt 64 kg (141 lb)   SpO2 99% Comment: room air "  BMI 24.98 kg/m²     Physical Exam     Constitutional: Patient is in no acute distress.  Eyes: Nonicteric bilaterally.   Ears: Tympanic membranes are clear with normal ear canal.   Throat: Oral pharynx is clear.   Neck: Supple without palpable adenopathy. Thyroid is nonenlarged, nontender and no masses. No supraclavicular adenopathy.  Heart: Regular rate and rhythm without murmur, gallop or rub.  Lungs: Good air movement bilaterally, clear without crackles or wheeze.  Abdomen: Bowel sounds are positive. The abdomen is soft and nontender. No organomegaly or palpable mass.   Extremities: Without pedal edema.   Neurologic: No lateralizing focal neurologic deficit.   Psychiatric: Patient has normal mood, attention, and behavior with normal thought content.    Result Review :                     Assessment and Plan    Diagnoses and all orders for this visit:    1. Hypertension, essential (Primary)  Comments:  Blood pressure looks good continue on current regimen  Orders:  -     Comprehensive Metabolic Panel    2. Mixed hyperlipidemia  Comments:  Check lab predicate medication change based on results  Orders:  -     Lipid Panel  -     Comprehensive Metabolic Panel    3. Atherosclerosis of native coronary artery of native heart without angina pectoris  Comments:  Sounds like she is fairly stable.  I am not sure her fatigue was related to angina anyway.  Continue on current regimen with cardiology  I advised the patient to take the isosorbide mononitrate every morning instead, but if she does not recall to take it in the morning, to continue taking it at night.  Also take her off of Nexium switch her to Protonix since she is also taking Plavix    4. Osteopenia of multiple sites  Comments:  Reviewed results of DEXA.  Hold off on consideration of treatment until we see results of EGD  Orders:  -     Vitamin D 25 Hydroxy    5. Problem related to lifestyle  -     Hepatitis C Antibody  I encouraged the patient to get the  influenza vaccination; however, she declined.    6. Colon cancer screening  -     Ambulatory Referral to General Surgery    7. Gastroesophageal reflux disease, unspecified whether esophagitis present  Comments:  Change from Nexium to Protonix since she is also taking Plavix.  Since she has persistent symptoms think she deserves EGD  Orders:  -     pantoprazole (Protonix) 40 MG EC tablet; Take 1 tablet by mouth Daily.  Dispense: 90 tablet; Refill: 1  -     Ambulatory Referral to General Surgery  The patient would be referred to see Dr. Vincent about having an upper endoscopy performed. I would like for her to be scheduled for a colonoscopy as well. We are going to obtain blood work for BUN, creatinine, cholesterol, liver functions, and hepatitis c. If her vitamin D lab work is low, we would suggest that she supplements it.    8. Raynaud's disease without gangrene  Comments:  Provided some information about Raynaud's disease        Follow Up   No follow-ups on file.  Patient was given instructions and counseling regarding her condition or for health maintenance advice. Please see specific information pulled into the AVS if appropriate.       Transcribed from ambient dictation for Hudson Leon MD by Alex Dave.  02/11/22   15:53 EST    Patient verbalized consent to the visit recording.

## 2022-02-12 DIAGNOSIS — I10 HYPERTENSION, ESSENTIAL: Primary | ICD-10-CM

## 2022-02-12 PROBLEM — N18.32 STAGE 3B CHRONIC KIDNEY DISEASE: Status: ACTIVE | Noted: 2022-02-12

## 2022-02-12 RX ORDER — AMLODIPINE BESYLATE 2.5 MG/1
2.5 TABLET ORAL DAILY
Qty: 30 TABLET | Refills: 5 | Status: SHIPPED | OUTPATIENT
Start: 2022-02-12 | End: 2023-01-24

## 2022-02-14 ENCOUNTER — TELEPHONE (OUTPATIENT)
Dept: FAMILY MEDICINE CLINIC | Age: 72
End: 2022-02-14

## 2022-02-14 NOTE — TELEPHONE ENCOUNTER
Pts  called stating that you prescribed her Amlodipine after her visit with you Friday, but she is taking Isorsorbide from her cardiologist and wanted to know why you wanted her to take that.  Please advise

## 2022-02-15 NOTE — TELEPHONE ENCOUNTER
Pt states that she is not going to take the Amlodipine until she sees her cardiologist in April unless you tell her why you want her to take it.  She is not comfortable adding another medication

## 2022-02-16 NOTE — TELEPHONE ENCOUNTER
Below is the note that was attached to her lab.  It explains why she is to take amlodipine:        The lab results in general look okay.  There has been a slight drop off in the kidney function, for which there could be several explanations.  One of those possibilities could be an effect from the diuretic (HCTZ).  Therefore, I would like for you to try taking just HALF of your losartan/HCTZ tablet daily.       In order to keep the blood pressure from worsening as a result, I also recommend we start you on a new medication amlodipine 2.5 mg daily.  I realize that taking 3 separate pills for your blood pressure will not thrill you, but it is a necessary step in order to protect your kidneys while at the same time limiting your risk for cardiovascular disease.     To gauge the effect of the medication changes stop by the office in 1 month to get your blood pressure rechecked, and while here repeat a blood test.    [facility],  Ganesh Leon MD

## 2022-04-04 ENCOUNTER — TELEPHONE (OUTPATIENT)
Dept: FAMILY MEDICINE CLINIC | Age: 72
End: 2022-04-04

## 2022-04-04 NOTE — TELEPHONE ENCOUNTER
Spoke to pt  regarding BMP ordered 2/12/22, states she has not been taking the medication that was prescribed, but has decreased the amount of excedrin she has been taking and drinking more fluids, will come in this week to repeat BMP, wanted to talk to her cardiologist before starting a new med.

## 2022-04-05 ENCOUNTER — LAB (OUTPATIENT)
Dept: LAB | Facility: HOSPITAL | Age: 72
End: 2022-04-05

## 2022-04-05 DIAGNOSIS — I10 HYPERTENSION, ESSENTIAL: ICD-10-CM

## 2022-04-05 LAB
ANION GAP SERPL CALCULATED.3IONS-SCNC: 9 MMOL/L (ref 5–15)
BUN SERPL-MCNC: 24 MG/DL (ref 8–23)
BUN/CREAT SERPL: 22.2 (ref 7–25)
CALCIUM SPEC-SCNC: 9.5 MG/DL (ref 8.6–10.5)
CHLORIDE SERPL-SCNC: 105 MMOL/L (ref 98–107)
CO2 SERPL-SCNC: 27 MMOL/L (ref 22–29)
CREAT SERPL-MCNC: 1.08 MG/DL (ref 0.57–1)
EGFRCR SERPLBLD CKD-EPI 2021: 55.4 ML/MIN/1.73
GLUCOSE SERPL-MCNC: 88 MG/DL (ref 65–99)
POTASSIUM SERPL-SCNC: 4.6 MMOL/L (ref 3.5–5.2)
SODIUM SERPL-SCNC: 141 MMOL/L (ref 136–145)

## 2022-04-05 PROCEDURE — 80048 BASIC METABOLIC PNL TOTAL CA: CPT

## 2022-04-05 PROCEDURE — 36415 COLL VENOUS BLD VENIPUNCTURE: CPT

## 2022-04-08 ENCOUNTER — TELEPHONE (OUTPATIENT)
Dept: FAMILY MEDICINE CLINIC | Age: 72
End: 2022-04-08

## 2022-04-11 DIAGNOSIS — I10 HYPERTENSION, ESSENTIAL: ICD-10-CM

## 2022-04-12 RX ORDER — LOSARTAN POTASSIUM AND HYDROCHLOROTHIAZIDE 25; 100 MG/1; MG/1
1 TABLET ORAL DAILY
Qty: 90 TABLET | Refills: 1 | Status: SHIPPED | OUTPATIENT
Start: 2022-04-12 | End: 2022-09-09

## 2022-05-12 DIAGNOSIS — K21.9 GASTROESOPHAGEAL REFLUX DISEASE, UNSPECIFIED WHETHER ESOPHAGITIS PRESENT: ICD-10-CM

## 2022-05-12 RX ORDER — PANTOPRAZOLE SODIUM 40 MG/1
40 TABLET, DELAYED RELEASE ORAL DAILY
Qty: 90 TABLET | Refills: 1 | OUTPATIENT
Start: 2022-05-12

## 2022-05-23 RX ORDER — AMLODIPINE BESYLATE 2.5 MG/1
2.5 TABLET ORAL DAILY
Qty: 30 TABLET | Refills: 5 | OUTPATIENT
Start: 2022-05-23

## 2022-07-29 DIAGNOSIS — K21.9 GASTROESOPHAGEAL REFLUX DISEASE, UNSPECIFIED WHETHER ESOPHAGITIS PRESENT: ICD-10-CM

## 2022-07-29 RX ORDER — PANTOPRAZOLE SODIUM 40 MG/1
40 TABLET, DELAYED RELEASE ORAL DAILY
Qty: 90 TABLET | Refills: 1 | Status: SHIPPED | OUTPATIENT
Start: 2022-07-29 | End: 2023-02-08 | Stop reason: SDUPTHER

## 2022-08-19 ENCOUNTER — OFFICE VISIT (OUTPATIENT)
Dept: FAMILY MEDICINE CLINIC | Age: 72
End: 2022-08-19

## 2022-08-19 ENCOUNTER — LAB (OUTPATIENT)
Dept: LAB | Facility: HOSPITAL | Age: 72
End: 2022-08-19

## 2022-08-19 ENCOUNTER — TELEPHONE (OUTPATIENT)
Dept: FAMILY MEDICINE CLINIC | Age: 72
End: 2022-08-19

## 2022-08-19 ENCOUNTER — TRANSCRIBE ORDERS (OUTPATIENT)
Dept: ADMINISTRATIVE | Facility: HOSPITAL | Age: 72
End: 2022-08-19

## 2022-08-19 VITALS
SYSTOLIC BLOOD PRESSURE: 139 MMHG | OXYGEN SATURATION: 96 % | HEIGHT: 63 IN | WEIGHT: 141 LBS | BODY MASS INDEX: 24.98 KG/M2 | DIASTOLIC BLOOD PRESSURE: 75 MMHG | HEART RATE: 49 BPM

## 2022-08-19 DIAGNOSIS — Z12.31 VISIT FOR SCREENING MAMMOGRAM: Primary | ICD-10-CM

## 2022-08-19 DIAGNOSIS — Z00.00 MEDICARE ANNUAL WELLNESS VISIT, SUBSEQUENT: Primary | ICD-10-CM

## 2022-08-19 DIAGNOSIS — M85.89 OSTEOPENIA OF MULTIPLE SITES: ICD-10-CM

## 2022-08-19 DIAGNOSIS — I25.10 ATHEROSCLEROSIS OF NATIVE CORONARY ARTERY OF NATIVE HEART WITHOUT ANGINA PECTORIS: ICD-10-CM

## 2022-08-19 DIAGNOSIS — I10 HYPERTENSION, ESSENTIAL: ICD-10-CM

## 2022-08-19 DIAGNOSIS — G47.9 SLEEP DISTURBANCE: ICD-10-CM

## 2022-08-19 DIAGNOSIS — N18.31 STAGE 3A CHRONIC KIDNEY DISEASE: ICD-10-CM

## 2022-08-19 DIAGNOSIS — R01.1 MURMUR: ICD-10-CM

## 2022-08-19 LAB
ALBUMIN SERPL-MCNC: 4.4 G/DL (ref 3.5–5.2)
ALBUMIN/GLOB SERPL: 2 G/DL
ALP SERPL-CCNC: 81 U/L (ref 39–117)
ALT SERPL W P-5'-P-CCNC: 24 U/L (ref 1–33)
ANION GAP SERPL CALCULATED.3IONS-SCNC: 10.8 MMOL/L (ref 5–15)
AST SERPL-CCNC: 26 U/L (ref 1–32)
BASOPHILS # BLD AUTO: 0.02 10*3/MM3 (ref 0–0.2)
BASOPHILS NFR BLD AUTO: 0.4 % (ref 0–1.5)
BILIRUB SERPL-MCNC: 0.4 MG/DL (ref 0–1.2)
BUN SERPL-MCNC: 29 MG/DL (ref 8–23)
BUN/CREAT SERPL: 25.4 (ref 7–25)
CALCIUM SPEC-SCNC: 9.6 MG/DL (ref 8.6–10.5)
CHLORIDE SERPL-SCNC: 102 MMOL/L (ref 98–107)
CO2 SERPL-SCNC: 26.2 MMOL/L (ref 22–29)
CREAT SERPL-MCNC: 1.14 MG/DL (ref 0.57–1)
DEPRECATED RDW RBC AUTO: 42.6 FL (ref 37–54)
EGFRCR SERPLBLD CKD-EPI 2021: 51.9 ML/MIN/1.73
EOSINOPHIL # BLD AUTO: 0.15 10*3/MM3 (ref 0–0.4)
EOSINOPHIL NFR BLD AUTO: 2.6 % (ref 0.3–6.2)
ERYTHROCYTE [DISTWIDTH] IN BLOOD BY AUTOMATED COUNT: 13.1 % (ref 12.3–15.4)
GLOBULIN UR ELPH-MCNC: 2.2 GM/DL
GLUCOSE SERPL-MCNC: 90 MG/DL (ref 65–99)
HCT VFR BLD AUTO: 38.2 % (ref 34–46.6)
HGB BLD-MCNC: 12.4 G/DL (ref 12–15.9)
IMM GRANULOCYTES # BLD AUTO: 0.03 10*3/MM3 (ref 0–0.05)
IMM GRANULOCYTES NFR BLD AUTO: 0.5 % (ref 0–0.5)
LYMPHOCYTES # BLD AUTO: 1.55 10*3/MM3 (ref 0.7–3.1)
LYMPHOCYTES NFR BLD AUTO: 27.1 % (ref 19.6–45.3)
MCH RBC QN AUTO: 28.4 PG (ref 26.6–33)
MCHC RBC AUTO-ENTMCNC: 32.5 G/DL (ref 31.5–35.7)
MCV RBC AUTO: 87.4 FL (ref 79–97)
MONOCYTES # BLD AUTO: 0.62 10*3/MM3 (ref 0.1–0.9)
MONOCYTES NFR BLD AUTO: 10.9 % (ref 5–12)
NEUTROPHILS NFR BLD AUTO: 3.34 10*3/MM3 (ref 1.7–7)
NEUTROPHILS NFR BLD AUTO: 58.5 % (ref 42.7–76)
PLATELET # BLD AUTO: 184 10*3/MM3 (ref 140–450)
PMV BLD AUTO: 9.6 FL (ref 6–12)
POTASSIUM SERPL-SCNC: 4.4 MMOL/L (ref 3.5–5.2)
PROT SERPL-MCNC: 6.6 G/DL (ref 6–8.5)
RBC # BLD AUTO: 4.37 10*6/MM3 (ref 3.77–5.28)
SODIUM SERPL-SCNC: 139 MMOL/L (ref 136–145)
TSH SERPL DL<=0.05 MIU/L-ACNC: 3.3 UIU/ML (ref 0.27–4.2)
WBC NRBC COR # BLD: 5.71 10*3/MM3 (ref 3.4–10.8)

## 2022-08-19 PROCEDURE — 36415 COLL VENOUS BLD VENIPUNCTURE: CPT | Performed by: FAMILY MEDICINE

## 2022-08-19 PROCEDURE — 99214 OFFICE O/P EST MOD 30 MIN: CPT | Performed by: FAMILY MEDICINE

## 2022-08-19 PROCEDURE — 80053 COMPREHEN METABOLIC PANEL: CPT | Performed by: FAMILY MEDICINE

## 2022-08-19 PROCEDURE — 1159F MED LIST DOCD IN RCRD: CPT | Performed by: FAMILY MEDICINE

## 2022-08-19 PROCEDURE — G0439 PPPS, SUBSEQ VISIT: HCPCS | Performed by: FAMILY MEDICINE

## 2022-08-19 PROCEDURE — 85025 COMPLETE CBC W/AUTO DIFF WBC: CPT | Performed by: FAMILY MEDICINE

## 2022-08-19 PROCEDURE — 84443 ASSAY THYROID STIM HORMONE: CPT | Performed by: FAMILY MEDICINE

## 2022-08-19 PROCEDURE — 1170F FXNL STATUS ASSESSED: CPT | Performed by: FAMILY MEDICINE

## 2022-08-19 NOTE — PROGRESS NOTES
The ABCs of the Annual Wellness Visit  Subsequent Medicare Wellness Visit    Chief Complaint   Patient presents with   • Medicare Wellness-subsequent   • Hypertension   • Hyperlipidemia   • Heartburn   • Chronic Kidney Disease      Subjective    History of Present Illness:  Linda Mitchell is a 71 y.o. female who presents for a Subsequent Medicare Wellness Visit.    The following portions of the patient's history were reviewed and   updated as appropriate: allergies, current medications, past family history, past medical history, past social history, past surgical history and problem list.    Compared to one year ago, the patient feels her physical   health is the same.    Compared to one year ago, the patient feels her mental   health is the same.    Recent Hospitalizations:  She was not admitted to the hospital during the last year.   Chronic/Acute Health Issues:    Has finally been able to move into her new house after starting on it in Nov 2021.    She did not start on the Amlodipine 2.5 mg as prescribed.  Says she just did not feel comfortable taking it because she had not had an opportunity to discuss the medication yet.  She does say cardiologist had encouraged her to take the medication if her BP ran above 130.  We reviewed the previous lab result and the advice to get on the amlodipine.  Explained that keeping the blood pressure under control helps preserve kidney function.    She did undergo EGD and colonoscopy.  We have the path report for the EGD and it did reveal esophagitis negative for metaplasia, dysplasia, or malignancy.    She was found to have osteopenia on previous DEXA scan.  We held off on consideration of low-dose bisphosphonate till after her EGD.    Her , Venkata, told her that he was worried that she wasn't breathing normally at night he thinks she should be tested for sleep apnea.     Current Medical Providers:  Patient Care Team:  Hudson Leon MD as PCP - General (Family  Medicine)    Outpatient Medications Prior to Visit   Medication Sig Dispense Refill   • aspirin 81 MG EC tablet Take 81 mg by mouth Daily.     • aspirin-acetaminophen-caffeine (EXCEDRIN MIGRAINE) 250-250-65 MG per tablet Take 1 tablet by mouth Every 6 (Six) Hours As Needed for Headache.     • atorvastatin (LIPITOR) 80 MG tablet TAKE 1/2 TABLET BY MOUTH DAILY 45 tablet 1   • clopidogrel (PLAVIX) 75 MG tablet Take 75 mg by mouth Daily.     • famotidine (PEPCID) 10 MG tablet Take 10 mg by mouth As Needed for Heartburn.     • isosorbide mononitrate (IMDUR) 30 MG 24 hr tablet      • losartan-hydrochlorothiazide (Hyzaar) 100-25 MG per tablet Take 1 tablet by mouth Daily. 90 tablet 1   • metoprolol tartrate (LOPRESSOR) 25 MG tablet Take 25 mg by mouth Daily.     • pantoprazole (PROTONIX) 40 MG EC tablet TAKE 1 TABLET BY MOUTH DAILY 90 tablet 1   • Vitamin E 450 MG capsule Take  by mouth.     • amLODIPine (NORVASC) 2.5 MG tablet Take 1 tablet by mouth Daily. 30 tablet 5     No facility-administered medications prior to visit.       No opioid medication identified on active medication list. I have reviewed chart for other potential  high risk medication/s and harmful drug interactions in the elderly.          Aspirin is on active medication list. Aspirin use is indicated based on review of current medical condition/s. Pros and cons of this therapy have been discussed today. Benefits of this medication outweigh potential harm.  Patient has been encouraged to continue taking this medication.  .      Patient Active Problem List   Diagnosis   • Atherosclerosis of native coronary artery of native heart without angina pectoris   • History of non-ST elevation myocardial infarction (NSTEMI)   • Hypertension, essential   • Mixed hyperlipidemia   • Anemia, unspecified   • Osteopenia of multiple sites   • Encounter for screening mammogram for malignant neoplasm of breast   • Colon cancer screening   • Medicare annual wellness visit,  "subsequent   • Actinic keratoses   • GERD (gastroesophageal reflux disease)   • Raynaud's disease without gangrene   • Stage 3a chronic kidney disease (HCC)   • Sleep disturbance   • Murmur     Advance Care Planning  Advance Directive is not on file.  ACP discussion was held with the patient during this visit. Patient does not have an advance directive, information provided.          Objective    Vitals:    08/19/22 1213   BP: 139/75   BP Location: Right arm   Patient Position: Sitting   Cuff Size: Adult   Pulse: (!) 49   SpO2: 96%  Comment: Room air   Weight: 64 kg (141 lb)   Height: 160 cm (62.99\")     Estimated body mass index is 24.98 kg/m² as calculated from the following:    Height as of this encounter: 160 cm (62.99\").    Weight as of this encounter: 64 kg (141 lb).    BMI is within normal parameters. No other follow-up for BMI required.      Does the patient have evidence of cognitive impairment? No    Physical Exam  Constitutional:       Appearance: Normal appearance.   Neck:      Vascular: No carotid bruit.   Cardiovascular:      Rate and Rhythm: Normal rate and regular rhythm.      Heart sounds: Murmur heard.   Pulmonary:      Effort: No respiratory distress.      Breath sounds: No wheezing or rales.   Musculoskeletal:      Right lower leg: No edema.      Left lower leg: No edema.   Lymphadenopathy:      Cervical: No cervical adenopathy.   Neurological:      General: No focal deficit present.      Mental Status: She is alert.   Psychiatric:         Attention and Perception: Attention normal.         Mood and Affect: Mood normal.         Speech: Speech normal.        Vitamin D 25 Hydroxy (02/11/2022 09:27)  Comprehensive Metabolic Panel (02/11/2022 09:27)  Lipid Panel (02/11/2022 09:27)  Hepatitis C Antibody (02/11/2022 09:27)                  HEALTH RISK ASSESSMENT    Smoking Status:  Social History     Tobacco Use   Smoking Status Never Smoker   Smokeless Tobacco Never Used     Alcohol " Consumption:  Social History     Substance and Sexual Activity   Alcohol Use Not Currently     Fall Risk Screen:    KRISTYNADI Fall Risk Assessment was completed, and patient is at LOW risk for falls.Assessment completed on:8/19/2022    Depression Screening:  PHQ-2/PHQ-9 Depression Screening 8/19/2022   Retired PHQ-9 Total Score -   Retired Total Score -   Little Interest or Pleasure in Doing Things 0-->not at all   Feeling Down, Depressed or Hopeless 0-->not at all   PHQ-9: Brief Depression Severity Measure Score 0       Health Habits and Functional and Cognitive Screening:  Functional & Cognitive Status 8/19/2022   Do you have difficulty preparing food and eating? No   Do you have difficulty bathing yourself, getting dressed or grooming yourself? No   Do you have difficulty using the toilet? No   Do you have difficulty moving around from place to place? No   Do you have trouble with steps or getting out of a bed or a chair? No   Current Diet Well Balanced Diet   Dental Exam Not up to date   Eye Exam Not up to date   Exercise (times per week) 7 times per week   Current Exercises Include Other        Exercise Comment -   Do you need help using the phone?  No   Are you deaf or do you have serious difficulty hearing?  No   Do you need help with transportation? No   Do you need help shopping? No   Do you need help preparing meals?  No   Do you need help with housework?  No   Do you need help with laundry? No   Do you need help taking your medications? No   Do you need help managing money? No   Do you ever drive or ride in a car without wearing a seat belt? No   Have you felt unusual stress, anger or loneliness in the last month? No   Who do you live with? Spouse   If you need help, do you have trouble finding someone available to you? No   Have you been bothered in the last four weeks by sexual problems? No   Do you have difficulty concentrating, remembering or making decisions? No       Age-appropriate Screening  Schedule:  Refer to the list below for future screening recommendations based on patient's age, sex and/or medical conditions. Orders for these recommended tests are listed in the plan section. The patient has been provided with a written plan.    Health Maintenance   Topic Date Due   • TDAP/TD VACCINES (1 - Tdap) Never done   • ZOSTER VACCINE (1 of 2) Never done   • INFLUENZA VACCINE  10/01/2022   • LIPID PANEL  02/11/2023   • MAMMOGRAM  09/20/2023   • DXA SCAN  09/20/2023              Assessment & Plan   CMS Preventative Services Quick Reference  Risk Factors Identified During Encounter  None Identified  The above risks/problems have been discussed with the patient.  Follow up actions/plans if indicated are seen below in the Assessment/Plan Section.  Pertinent information has been shared with the patient in the After Visit Summary.    Diagnoses and all orders for this visit:    1. Medicare annual wellness visit, subsequent (Primary)  Comments:  We reviewed the preventive service recommendations and created an individualized handout    2. Hypertension, essential  Comments:  After our discussions today she says she will go ahead and initiate the amlodipine at 2.5 mg daily    3. Stage 3a chronic kidney disease (HCC)  Assessment & Plan:  We reviewed her previous lab results.  Kidney function is slightly compromised.  Explained that control of blood pressure can be protective and help preserve kidney function    Orders:  -     Comprehensive Metabolic Panel  -     CBC & Differential    4. Osteopenia of multiple sites  Assessment & Plan:  We reviewed the results of the bone density study.  Discussed options for treatment if she progresses to osteoporosis.  While there is a low dose bisphosphonate for osteopenia given her finding of esophagitis probably best to avoid that for now.    Orders:  -     TSH    5. Atherosclerosis of native coronary artery of native heart without angina pectoris  Comments:  Risk factors need to  be controlled including blood pressure and cholesterol.  Follow-up with cardiology as recommended    6. Sleep disturbance  -     Ambulatory Referral to Sleep Medicine  -     TSH    7. Murmur  Comments:  I suggest that when she sees cardiologist to inquire if echocardiogram is recommended      Follow Up:   No follow-ups on file.     An After Visit Summary and PPPS were made available to the patient.

## 2022-08-19 NOTE — TELEPHONE ENCOUNTER
Get EGD and colonoscopy results performed April 2022 from [facility] or from Dr. Vincent's office.  Also get the pathology report for the colonoscopy if biopsies were taken.    mas

## 2022-08-22 NOTE — ASSESSMENT & PLAN NOTE
We reviewed the results of the bone density study.  Discussed options for treatment if she progresses to osteoporosis.  While there is a low dose bisphosphonate for osteopenia given her finding of esophagitis probably best to avoid that for now.

## 2022-08-22 NOTE — ASSESSMENT & PLAN NOTE
We reviewed her previous lab results.  Kidney function is slightly compromised.  Explained that control of blood pressure can be protective and help preserve kidney function

## 2022-09-08 DIAGNOSIS — I10 HYPERTENSION, ESSENTIAL: ICD-10-CM

## 2022-09-09 RX ORDER — ATORVASTATIN CALCIUM 80 MG/1
TABLET, FILM COATED ORAL
Qty: 45 TABLET | Refills: 1 | Status: SHIPPED | OUTPATIENT
Start: 2022-09-09

## 2022-09-09 RX ORDER — LOSARTAN POTASSIUM AND HYDROCHLOROTHIAZIDE 25; 100 MG/1; MG/1
TABLET ORAL
Qty: 90 TABLET | Refills: 1 | Status: SHIPPED | OUTPATIENT
Start: 2022-09-09

## 2022-09-30 ENCOUNTER — HOSPITAL ENCOUNTER (OUTPATIENT)
Dept: MAMMOGRAPHY | Facility: HOSPITAL | Age: 72
Discharge: HOME | End: 2022-09-30
Admitting: FAMILY MEDICINE

## 2022-09-30 DIAGNOSIS — Z12.31 VISIT FOR SCREENING MAMMOGRAM: ICD-10-CM

## 2022-09-30 PROCEDURE — 77067 SCR MAMMO BI INCL CAD: CPT

## 2022-09-30 PROCEDURE — 77063 BREAST TOMOSYNTHESIS BI: CPT

## 2022-10-03 DIAGNOSIS — R92.8 ABNORMAL MAMMOGRAM: Primary | ICD-10-CM

## 2022-10-03 DIAGNOSIS — R92.1 BREAST CALCIFICATIONS: ICD-10-CM

## 2022-10-06 ENCOUNTER — LAB (OUTPATIENT)
Dept: LAB | Facility: HOSPITAL | Age: 72
End: 2022-10-06

## 2022-10-06 ENCOUNTER — OFFICE VISIT (OUTPATIENT)
Dept: SLEEP MEDICINE | Facility: HOSPITAL | Age: 72
End: 2022-10-06

## 2022-10-06 VITALS — OXYGEN SATURATION: 99 % | WEIGHT: 143.4 LBS | HEART RATE: 49 BPM | HEIGHT: 63 IN | BODY MASS INDEX: 25.41 KG/M2

## 2022-10-06 DIAGNOSIS — R63.8 OTHER SYMPTOMS AND SIGNS CONCERNING FOOD AND FLUID INTAKE: ICD-10-CM

## 2022-10-06 DIAGNOSIS — G47.30 SLEEP APNEA, UNSPECIFIED TYPE: Primary | ICD-10-CM

## 2022-10-06 DIAGNOSIS — R06.83 SNORING: ICD-10-CM

## 2022-10-06 DIAGNOSIS — G47.10 HYPERSOMNIA: ICD-10-CM

## 2022-10-06 DIAGNOSIS — G25.81 RESTLESS LEGS: ICD-10-CM

## 2022-10-06 DIAGNOSIS — E66.3 OVERWEIGHT WITH BODY MASS INDEX (BMI) 25.0-29.9: ICD-10-CM

## 2022-10-06 DIAGNOSIS — G47.8 NON-RESTORATIVE SLEEP: ICD-10-CM

## 2022-10-06 LAB — FERRITIN SERPL-MCNC: 81.1 NG/ML (ref 13–150)

## 2022-10-06 PROCEDURE — 99204 OFFICE O/P NEW MOD 45 MIN: CPT | Performed by: FAMILY MEDICINE

## 2022-10-06 PROCEDURE — G0463 HOSPITAL OUTPT CLINIC VISIT: HCPCS | Performed by: FAMILY MEDICINE

## 2022-10-06 PROCEDURE — 82728 ASSAY OF FERRITIN: CPT | Performed by: FAMILY MEDICINE

## 2022-10-06 PROCEDURE — 36415 COLL VENOUS BLD VENIPUNCTURE: CPT | Performed by: FAMILY MEDICINE

## 2022-10-06 RX ORDER — FAMOTIDINE 40 MG/1
TABLET, FILM COATED ORAL
COMMUNITY
Start: 2022-09-06 | End: 2022-10-06

## 2022-10-06 NOTE — PROGRESS NOTES
[facility] New Patient/Consultation       Reason for Consultation: Sleep disturbance      Patient Care Team:  Hudson Leon MD as PCP - General (Family Medicine)  Robert Vincent MD (General Surgery)  Mikael Bowman MD as Consulting Physician (Sleep Medicine)      History of present illness:  Thank you for asking me to see your patient.  The patient is a 72 y.o. female with hypertension acid reflux restless legs migraines history of MI presents with concern for sleep disorder.  No history of prior sleep study or tonsillectomy.  Patient reports hypersomnia nonrestorative sleep waking over the past 5 years snoring witnessed apneas waking up gasping for breath morning headaches waking with dry mouth leg jerking at night urge sensations which are worse at night sleep-related bruxism waking at night with GERD symptoms nocturia to 3 times a night.  No family history of sleep apnea she is aware of.  Overweight BMI 25.4.    Bedtime 11:30 PM sleep latency varies wake time 7:30 AM to 8:30 AM sleep 7 to 9 hours 1 nap per day no rotating shifts.      Social History: Retired no tobacco alcohol caffeine or drug use    Allergies:  Patient has no known allergies.    Family History: MJ no       Current Outpatient Medications:   •  amLODIPine (NORVASC) 2.5 MG tablet, Take 1 tablet by mouth Daily., Disp: 30 tablet, Rfl: 5  •  aspirin 81 MG EC tablet, Take 81 mg by mouth Daily., Disp: , Rfl:   •  aspirin-acetaminophen-caffeine (EXCEDRIN MIGRAINE) 250-250-65 MG per tablet, Take 1 tablet by mouth Every 6 (Six) Hours As Needed for Headache., Disp: , Rfl:   •  atorvastatin (LIPITOR) 80 MG tablet, TAKE 1/2 TABLET BY MOUTH DAILY, Disp: 45 tablet, Rfl: 1  •  clopidogrel (PLAVIX) 75 MG tablet, Take 75 mg by mouth Daily., Disp: , Rfl:   •  famotidine (PEPCID) 10 MG tablet, Take 10 mg by mouth As Needed for Heartburn., Disp: , Rfl:   •  isosorbide mononitrate (IMDUR) 30 MG 24 hr tablet, , Disp: , Rfl:   •   "losartan-hydrochlorothiazide (HYZAAR) 100-25 MG per tablet, TAKE 1 TABLET BY MOUTH EVERY DAY, Disp: 90 tablet, Rfl: 1  •  metoprolol tartrate (LOPRESSOR) 25 MG tablet, Take 25 mg by mouth Daily., Disp: , Rfl:   •  pantoprazole (PROTONIX) 40 MG EC tablet, TAKE 1 TABLET BY MOUTH DAILY, Disp: 90 tablet, Rfl: 1  •  Vitamin E 450 MG capsule, Take  by mouth., Disp: , Rfl:     Vital Signs:    Vitals:    10/06/22 0900   Pulse: (!) 49   SpO2: 99%   Weight: 65 kg (143 lb 6.4 oz)   Height: 160 cm (63\")      Body mass index is 25.4 kg/m².         REVIEW OF SYSTEMS.  Full review of systems available on the intake form which is scanned in the media tab.  The relevant positive are noted below  1. Daytime excessive sleepiness with [place] Sleepiness Scale :Total score: 9   2. Snoring  3. Fatigue frequent heartburn      Physical exam:  Vitals:    10/06/22 0900   Pulse: (!) 49   SpO2: 99%   Weight: 65 kg (143 lb 6.4 oz)   Height: 160 cm (63\")    Body mass index is 25.4 kg/m².    HEENT: Head is atraumatic, normocephalic  Eyes: pupils are round equal and reacting to light and accommodation, conjunctiva normal  RESPIRATORY SYSTEM: Regular respirations  EXTREMITES: No cyanosis, clubbing  NEUROLOGICAL SYSTEM: Oriented x 3, no gross motor defects, gait normal      Impression:  1. Sleep apnea, unspecified type    2. Hypersomnia    3. Non-restorative sleep    4. Overweight with body mass index (BMI) 25.0-29.9    5. Snoring    6. Restless legs    7. Other symptoms and signs concerning food and fluid intake         Plan:    Good sleep hygiene measures should be maintained.  Weight loss would be beneficial in this patient who is overweight BMI 25.4.    I discussed the pathophysiology of obstructive sleep apnea with the patient.  We discussed the adverse outcomes associated with untreated sleep-disordered breathing.  We discussed treatment modalities of obstructive sleep apnea including CPAP device as well as oral mandibular advancement device. " Sleep study will be scheduled to establish definitive diagnosis of sleep disorder breathing.  Weight loss will be strongly beneficial in order to reduce the severity of sleep-disordered breathing.  Caution during activities that require prolonged concentration is strongly advised.  Patient will be notified of sleep study results after sleep study is completed.  If sleep apnea is only mild,  oral mandibular advancement device may be one of the treatment options.  However if sleep apnea is moderately severe, CPAP treatment will be strongly encouraged.      Patient refuses in lab study prefers home sleep study.    If ferritin is less than 45 consider iron supplementation for restless leg symptoms.  Patient opposed to dopamine agonist medication treatment for restless leg syndrome.    Thank you for allowing me to participate in your patient's care.    Mikael Bowman MD  Sleep Medicine  10/06/22  10:28 EDT

## 2022-10-11 ENCOUNTER — HOSPITAL ENCOUNTER (OUTPATIENT)
Dept: MAMMOGRAPHY | Facility: HOSPITAL | Age: 72
Discharge: HOME | End: 2022-10-11
Admitting: FAMILY MEDICINE

## 2022-10-11 DIAGNOSIS — R92.8 ABNORMAL MAMMOGRAM: ICD-10-CM

## 2022-10-11 DIAGNOSIS — R92.1 BREAST CALCIFICATIONS: ICD-10-CM

## 2022-10-11 PROCEDURE — G0279 TOMOSYNTHESIS, MAMMO: HCPCS

## 2022-10-11 PROCEDURE — 77065 DX MAMMO INCL CAD UNI: CPT

## 2022-10-31 DIAGNOSIS — K21.9 GASTROESOPHAGEAL REFLUX DISEASE, UNSPECIFIED WHETHER ESOPHAGITIS PRESENT: ICD-10-CM

## 2022-10-31 RX ORDER — PANTOPRAZOLE SODIUM 40 MG/1
40 TABLET, DELAYED RELEASE ORAL DAILY
Qty: 90 TABLET | Refills: 1 | OUTPATIENT
Start: 2022-10-31

## 2022-11-28 ENCOUNTER — HOSPITAL ENCOUNTER (OUTPATIENT)
Dept: SLEEP MEDICINE | Facility: HOSPITAL | Age: 72
Discharge: HOME | End: 2022-11-28
Admitting: FAMILY MEDICINE

## 2022-11-28 DIAGNOSIS — G47.10 HYPERSOMNIA: ICD-10-CM

## 2022-11-28 DIAGNOSIS — E66.3 OVERWEIGHT WITH BODY MASS INDEX (BMI) 25.0-29.9: ICD-10-CM

## 2022-11-28 DIAGNOSIS — R06.83 SNORING: ICD-10-CM

## 2022-11-28 DIAGNOSIS — G25.81 RESTLESS LEGS: ICD-10-CM

## 2022-11-28 DIAGNOSIS — G47.8 NON-RESTORATIVE SLEEP: ICD-10-CM

## 2022-11-28 DIAGNOSIS — G47.30 SLEEP APNEA, UNSPECIFIED TYPE: ICD-10-CM

## 2022-11-28 PROCEDURE — 95806 SLEEP STUDY UNATT&RESP EFFT: CPT

## 2022-11-28 PROCEDURE — 95806 SLEEP STUDY UNATT&RESP EFFT: CPT | Performed by: FAMILY MEDICINE

## 2022-12-05 RX ORDER — ATORVASTATIN CALCIUM 80 MG/1
TABLET, FILM COATED ORAL
Qty: 45 TABLET | Refills: 1 | OUTPATIENT
Start: 2022-12-05

## 2022-12-08 DIAGNOSIS — G47.10 HYPERSOMNIA: ICD-10-CM

## 2022-12-08 DIAGNOSIS — E66.3 OVERWEIGHT WITH BODY MASS INDEX (BMI) 25.0-29.9: ICD-10-CM

## 2022-12-08 DIAGNOSIS — G47.8 NON-RESTORATIVE SLEEP: ICD-10-CM

## 2022-12-08 DIAGNOSIS — R06.83 SNORING: ICD-10-CM

## 2022-12-08 DIAGNOSIS — G47.33 OBSTRUCTIVE SLEEP APNEA: Primary | ICD-10-CM

## 2022-12-08 DIAGNOSIS — G25.81 RESTLESS LEGS: ICD-10-CM

## 2022-12-09 ENCOUNTER — TELEPHONE (OUTPATIENT)
Dept: SLEEP MEDICINE | Facility: HOSPITAL | Age: 72
End: 2022-12-09

## 2022-12-21 DIAGNOSIS — I10 HYPERTENSION, ESSENTIAL: ICD-10-CM

## 2022-12-21 NOTE — TELEPHONE ENCOUNTER
I recommend that she take the medication as prescribed until her follow-up visit.  We can discuss further at that time    mas

## 2022-12-21 NOTE — TELEPHONE ENCOUNTER
Pt stated she had discussed with you only taking half of her losartan/HCTZ d/t her kidney function, pharm needing new script saying that, med pended

## 2022-12-30 RX ORDER — LOSARTAN POTASSIUM AND HYDROCHLOROTHIAZIDE 25; 100 MG/1; MG/1
0.5 TABLET ORAL DAILY
Qty: 90 TABLET | Refills: 0 | OUTPATIENT
Start: 2022-12-30

## 2023-01-24 RX ORDER — AMLODIPINE BESYLATE 2.5 MG/1
2.5 TABLET ORAL DAILY
Qty: 30 TABLET | Refills: 5 | Status: SHIPPED | OUTPATIENT
Start: 2023-01-24 | End: 2023-02-17 | Stop reason: SDUPTHER

## 2023-02-08 DIAGNOSIS — K21.9 GASTROESOPHAGEAL REFLUX DISEASE, UNSPECIFIED WHETHER ESOPHAGITIS PRESENT: ICD-10-CM

## 2023-02-08 RX ORDER — PANTOPRAZOLE SODIUM 40 MG/1
40 TABLET, DELAYED RELEASE ORAL DAILY
Qty: 90 TABLET | Refills: 1 | Status: SHIPPED | OUTPATIENT
Start: 2023-02-08

## 2023-02-08 NOTE — TELEPHONE ENCOUNTER
Caller: MILLIE MERCER    Relationship: Emergency Contact    Best call back number: [phone]    Requested Prescriptions:   Requested Prescriptions     Pending Prescriptions Disp Refills   • pantoprazole (PROTONIX) 40 MG EC tablet 90 tablet 1     Sig: Take 1 tablet by mouth Daily.        Pharmacy where request should be sent: [id number] - [facility] [address] AT [facility] [address] [facility] - [phone] [facility] [phone] FX       Does the patient have less than a 3 day supply:  [] Yes  [x] No    Would you like a call back once the refill request has been completed: [] Yes [x] No    If the office needs to give you a call back, can they leave a voicemail: [] Yes [x] No    Denilson Mon Rep   02/08/23 09:15 EST

## 2023-02-09 ENCOUNTER — TELEPHONE (OUTPATIENT)
Dept: SLEEP MEDICINE | Facility: HOSPITAL | Age: 73
End: 2023-02-09

## 2023-02-09 ENCOUNTER — OFFICE VISIT (OUTPATIENT)
Dept: SLEEP MEDICINE | Facility: HOSPITAL | Age: 73
End: 2023-02-09
Payer: MEDICARE

## 2023-02-09 DIAGNOSIS — G47.33 OBSTRUCTIVE SLEEP APNEA: Primary | ICD-10-CM

## 2023-02-09 PROCEDURE — 99214 OFFICE O/P EST MOD 30 MIN: CPT | Performed by: FAMILY MEDICINE

## 2023-02-09 NOTE — PROGRESS NOTES
Linda Mitchell    1950  [id number]    This visit was completed via telephone.  Telehealth visit with audio component.  All issues as below were discussed and addressed but no physical exam was performed.  If it was felt that the patient should be evaluated in clinic then they were directed there.  The patient verbally consented to visit.    HPI    72-year-old female last seen in the sleep lab 11/28/2022 for home sleep study; found to have overall AHI 7.4 events per hour lowest SPO2 89%.  Advised auto CPAP 6-15 cm H2O.  Patient presents today for first follow-up since starting PAP machine.    DME: [facility]; data range: 1/9/2023 - 2/2/2023 average usage: 5 hours 45 minutes; average AHI 1.0 events per hour average pressure 11.7 cm H2O.    Today patient reports originally having trouble with her first mask; air was leaking out. Then was sent a second mask which goes over the nose; however this coincided with derm appointment which led to 30 day treatment of medication on nose. Since then has gotten more used to original mask. Patient reports improvement in hypersomnia nonrestorative sleep.  Air leak can be an issue when pressure gets higher later in the night.     Diagnoses and all orders for this visit:    1. Obstructive sleep apnea (Primary)  -     PAP Therapy  -     PAP Therapy    Other orders  -     SCANNED - PULMONARY RESULTS         Obstructive sleep apnea adequately treated with auto CPAP 6-15 cm H2O with good compliance and usage and no complaints of hypersomnolence.  Change P to 10 cm H2O for air leak.  Order will be placed for this.  Separate order will be placed for supply renewal.  Return to clinic in 2  months for follow-up or sooner if needed.    Patient uses the CPAP device and benefits from its use in terms of reduction of hypersomnia and snoring. Caution during activities that require prolonged concentration is strongly advised if sleepiness returns. Changing of PAP supplies regularly is important  for effective use. Patient needs to change cushion on the mask or plugs on nasal pillows along with disposable filters once every month and change mask frame, tubing, headgear and Velcro straps every 6 months at the minimum.      Any medications prescribed have been sent electronically to   [facility] #[id number] - [place], [place] - [address] [place] [facility] [address] [facility] - [phone]  - [phone] FX  [address] [place] [id number]  Phone: [phone] Fax: [phone]        Mikael Bowman MD  02/09/23  09:46 EST

## 2023-02-17 ENCOUNTER — LAB (OUTPATIENT)
Dept: LAB | Facility: HOSPITAL | Age: 73
End: 2023-02-17
Payer: MEDICARE

## 2023-02-17 ENCOUNTER — OFFICE VISIT (OUTPATIENT)
Dept: FAMILY MEDICINE CLINIC | Age: 73
End: 2023-02-17
Payer: MEDICARE

## 2023-02-17 VITALS
SYSTOLIC BLOOD PRESSURE: 143 MMHG | TEMPERATURE: 97.8 F | DIASTOLIC BLOOD PRESSURE: 76 MMHG | OXYGEN SATURATION: 98 % | HEIGHT: 63 IN | BODY MASS INDEX: 25.73 KG/M2 | HEART RATE: 77 BPM | WEIGHT: 145.25 LBS

## 2023-02-17 DIAGNOSIS — E78.2 MIXED HYPERLIPIDEMIA: ICD-10-CM

## 2023-02-17 DIAGNOSIS — Z79.899 LONG-TERM USE OF HIGH-RISK MEDICATION: ICD-10-CM

## 2023-02-17 DIAGNOSIS — D64.9 ANEMIA, UNSPECIFIED TYPE: ICD-10-CM

## 2023-02-17 DIAGNOSIS — N18.31 STAGE 3A CHRONIC KIDNEY DISEASE: ICD-10-CM

## 2023-02-17 DIAGNOSIS — R53.83 FATIGUE, UNSPECIFIED TYPE: ICD-10-CM

## 2023-02-17 DIAGNOSIS — I10 HYPERTENSION, ESSENTIAL: ICD-10-CM

## 2023-02-17 DIAGNOSIS — R01.1 MURMUR: Primary | ICD-10-CM

## 2023-02-17 DIAGNOSIS — G47.30 SLEEP APNEA, UNSPECIFIED TYPE: ICD-10-CM

## 2023-02-17 LAB
ALBUMIN SERPL-MCNC: 4.1 G/DL (ref 3.5–5.2)
ALBUMIN/GLOB SERPL: 2.1 G/DL
ALP SERPL-CCNC: 79 U/L (ref 39–117)
ALT SERPL W P-5'-P-CCNC: 22 U/L (ref 1–33)
ANION GAP SERPL CALCULATED.3IONS-SCNC: 8 MMOL/L (ref 5–15)
AST SERPL-CCNC: 19 U/L (ref 1–32)
BASOPHILS # BLD AUTO: 0.03 10*3/MM3 (ref 0–0.2)
BASOPHILS NFR BLD AUTO: 0.6 % (ref 0–1.5)
BILIRUB SERPL-MCNC: 0.4 MG/DL (ref 0–1.2)
BUN SERPL-MCNC: 24 MG/DL (ref 8–23)
BUN/CREAT SERPL: 22 (ref 7–25)
CALCIUM SPEC-SCNC: 9.2 MG/DL (ref 8.6–10.5)
CHLORIDE SERPL-SCNC: 105 MMOL/L (ref 98–107)
CHOLEST SERPL-MCNC: 151 MG/DL (ref 0–200)
CK SERPL-CCNC: 83 U/L (ref 20–180)
CO2 SERPL-SCNC: 29 MMOL/L (ref 22–29)
CREAT SERPL-MCNC: 1.09 MG/DL (ref 0.57–1)
DEPRECATED RDW RBC AUTO: 42.2 FL (ref 37–54)
EGFRCR SERPLBLD CKD-EPI 2021: 54.4 ML/MIN/1.73
EOSINOPHIL # BLD AUTO: 0.25 10*3/MM3 (ref 0–0.4)
EOSINOPHIL NFR BLD AUTO: 5.2 % (ref 0.3–6.2)
ERYTHROCYTE [DISTWIDTH] IN BLOOD BY AUTOMATED COUNT: 12.9 % (ref 12.3–15.4)
ERYTHROCYTE [SEDIMENTATION RATE] IN BLOOD: 2 MM/HR (ref 0–30)
GLOBULIN UR ELPH-MCNC: 2 GM/DL
GLUCOSE SERPL-MCNC: 94 MG/DL (ref 65–99)
HCT VFR BLD AUTO: 32.4 % (ref 34–46.6)
HDLC SERPL-MCNC: 40 MG/DL (ref 40–60)
HGB BLD-MCNC: 10.9 G/DL (ref 12–15.9)
IMM GRANULOCYTES # BLD AUTO: 0.01 10*3/MM3 (ref 0–0.05)
IMM GRANULOCYTES NFR BLD AUTO: 0.2 % (ref 0–0.5)
LDLC SERPL CALC-MCNC: 78 MG/DL (ref 0–100)
LDLC/HDLC SERPL: 1.81 {RATIO}
LYMPHOCYTES # BLD AUTO: 1.38 10*3/MM3 (ref 0.7–3.1)
LYMPHOCYTES NFR BLD AUTO: 28.9 % (ref 19.6–45.3)
MCH RBC QN AUTO: 29.7 PG (ref 26.6–33)
MCHC RBC AUTO-ENTMCNC: 33.6 G/DL (ref 31.5–35.7)
MCV RBC AUTO: 88.3 FL (ref 79–97)
MONOCYTES # BLD AUTO: 0.52 10*3/MM3 (ref 0.1–0.9)
MONOCYTES NFR BLD AUTO: 10.9 % (ref 5–12)
NEUTROPHILS NFR BLD AUTO: 2.58 10*3/MM3 (ref 1.7–7)
NEUTROPHILS NFR BLD AUTO: 54.2 % (ref 42.7–76)
PLATELET # BLD AUTO: 172 10*3/MM3 (ref 140–450)
PMV BLD AUTO: 9.3 FL (ref 6–12)
POTASSIUM SERPL-SCNC: 4.1 MMOL/L (ref 3.5–5.2)
PROT SERPL-MCNC: 6.1 G/DL (ref 6–8.5)
RBC # BLD AUTO: 3.67 10*6/MM3 (ref 3.77–5.28)
SODIUM SERPL-SCNC: 142 MMOL/L (ref 136–145)
TRIGL SERPL-MCNC: 194 MG/DL (ref 0–150)
TSH SERPL DL<=0.05 MIU/L-ACNC: 4.03 UIU/ML (ref 0.27–4.2)
VLDLC SERPL-MCNC: 33 MG/DL (ref 5–40)
WBC NRBC COR # BLD: 4.77 10*3/MM3 (ref 3.4–10.8)

## 2023-02-17 PROCEDURE — 85652 RBC SED RATE AUTOMATED: CPT | Performed by: FAMILY MEDICINE

## 2023-02-17 PROCEDURE — 85025 COMPLETE CBC W/AUTO DIFF WBC: CPT | Performed by: FAMILY MEDICINE

## 2023-02-17 PROCEDURE — 84443 ASSAY THYROID STIM HORMONE: CPT | Performed by: FAMILY MEDICINE

## 2023-02-17 PROCEDURE — 99214 OFFICE O/P EST MOD 30 MIN: CPT | Performed by: FAMILY MEDICINE

## 2023-02-17 PROCEDURE — 36415 COLL VENOUS BLD VENIPUNCTURE: CPT | Performed by: FAMILY MEDICINE

## 2023-02-17 PROCEDURE — 84466 ASSAY OF TRANSFERRIN: CPT | Performed by: FAMILY MEDICINE

## 2023-02-17 PROCEDURE — 82550 ASSAY OF CK (CPK): CPT | Performed by: FAMILY MEDICINE

## 2023-02-17 PROCEDURE — 80061 LIPID PANEL: CPT | Performed by: FAMILY MEDICINE

## 2023-02-17 PROCEDURE — 80053 COMPREHEN METABOLIC PANEL: CPT | Performed by: FAMILY MEDICINE

## 2023-02-17 PROCEDURE — 83540 ASSAY OF IRON: CPT | Performed by: FAMILY MEDICINE

## 2023-02-17 PROCEDURE — 82728 ASSAY OF FERRITIN: CPT | Performed by: FAMILY MEDICINE

## 2023-02-17 RX ORDER — AMLODIPINE BESYLATE 5 MG/1
5 TABLET ORAL DAILY
Qty: 90 TABLET | Refills: 1 | Status: SHIPPED | OUTPATIENT
Start: 2023-02-17

## 2023-02-17 NOTE — ASSESSMENT & PLAN NOTE
With her reported concerns about muscle fatigue I will go ahead and check a CK level sed rate to evaluate for reaction to her atorvastatin or possibly PMR.  Told her she could take a 2-week trial off of the medication to see if it affected her symptoms.

## 2023-02-17 NOTE — ASSESSMENT & PLAN NOTE
Blood pressure is still little higher than I would like to see him and increase her amlodipine to 5 mg daily.

## 2023-02-17 NOTE — PROGRESS NOTES
Chief Complaint  Hypertension (6 month follow up), Hyperlipidemia, and Chronic Kidney Disease    Subjective          Linda Mitchell presents to [facility]  History of Present Illness    At her office visit in August she agreed to go ahead and initiate treatment with amlodipine 2.5 mg to see if we get better control of blood pressure.    She does state in the past 4 months she has noted that she does not have quite the energy that she used to have.  She did have her sleep study and is now using CPAP and finds that it is helpful.    She is curious if the atorvastatin might be contributing to some of her fatigue weakness issues.  She says that her sister has difficulty taking statins.  She states that walking up the stairs from the basement will make her legs feel tired which is not normal for her.    At her visit in August we did refer her to sleep medicine.  Was started on CPAP and had televisit recently with her sleep specialist that note is reviewed.        Current Outpatient Medications   Medication Sig Dispense Refill   • Acetaminophen (TYLENOL 8 HOUR PO) Take  by mouth. PRN     • amLODIPine (NORVASC) 5 MG tablet Take 1 tablet by mouth Daily. 90 tablet 1   • aspirin 81 MG EC tablet Take 81 mg by mouth Daily.     • aspirin-acetaminophen-caffeine (EXCEDRIN MIGRAINE) 250-250-65 MG per tablet Take 1 tablet by mouth Every 6 (Six) Hours As Needed for Headache.     • atorvastatin (LIPITOR) 80 MG tablet TAKE 1/2 TABLET BY MOUTH DAILY 45 tablet 1   • clopidogrel (PLAVIX) 75 MG tablet Take 75 mg by mouth Daily.     • famotidine (PEPCID) 10 MG tablet Take 10 mg by mouth As Needed for Heartburn.     • isosorbide mononitrate (IMDUR) 30 MG 24 hr tablet      • losartan-hydrochlorothiazide (HYZAAR) 100-25 MG per tablet TAKE 1 TABLET BY MOUTH EVERY DAY 90 tablet 1   • metoprolol tartrate (LOPRESSOR) 25 MG tablet Take 25 mg by mouth Daily.     • Multiple Vitamins-Minerals (ONE-A-DAY WOMENS 50+ PO)  "Take  by mouth Daily.     • pantoprazole (PROTONIX) 40 MG EC tablet Take 1 tablet by mouth Daily. 90 tablet 1   • Vitamin E 450 MG capsule Take  by mouth.       No current facility-administered medications for this visit.       Review of Systems         Objective   Vital Signs:   /76 (BP Location: Left arm, Patient Position: Sitting, Cuff Size: Adult)   Pulse 77   Temp 97.8 °F (36.6 °C)   Ht 160 cm (62.99\")   Wt 65.9 kg (145 lb 4 oz)   SpO2 98%   BMI 25.74 kg/m²     Physical Exam  Constitutional:       Appearance: Normal appearance.   Neck:      Vascular: No carotid bruit.   Cardiovascular:      Rate and Rhythm: Normal rate and regular rhythm.      Heart sounds: Murmur ( 2/6 to 3/6 systolic right upper sternal border) heard.   Pulmonary:      Effort: No respiratory distress.      Breath sounds: No wheezing or rales.   Musculoskeletal:      Right lower leg: No edema.      Left lower leg: No edema.   Lymphadenopathy:      Cervical: No cervical adenopathy.   Neurological:      General: No focal deficit present.      Mental Status: She is alert.   Psychiatric:         Attention and Perception: Attention normal.         Mood and Affect: Mood normal.         Speech: Speech normal.            Result Review :                     Assessment and Plan    Diagnoses and all orders for this visit:    1. Murmur (Primary)  Assessment & Plan:  She does have a substantial murmur.  I could not find a copy of her most recent echo.  Asked her to inquire with the cardiologist.      2. Sleep apnea, unspecified type  Assessment & Plan:  Continue with PAP as directed      3. Hypertension, essential  Assessment & Plan:  Blood pressure is still little higher than I would like to see him and increase her amlodipine to 5 mg daily.      4. Mixed hyperlipidemia  Assessment & Plan:  With her reported concerns about muscle fatigue I will go ahead and check a CK level sed rate to evaluate for reaction to her atorvastatin or possibly PMR. "  Told her she could take a 2-week trial off of the medication to see if it affected her symptoms.    Orders:  -     Lipid Panel    5. Stage 3a chronic kidney disease (HCC)  Assessment & Plan:  Check labs for follow-up    Orders:  -     Comprehensive Metabolic Panel  -     CBC & Differential    6. Fatigue, unspecified type  Assessment & Plan:  Perhaps we will see if fatigue improves now that CPAP is in place.  We will check labs and her trial off of the atorvastatin and see if there is any effect.    Orders:  -     TSH  -     Sedimentation rate, automated    7. Long-term use of high-risk medication  -     CK  -     Sedimentation rate, automated    Other orders  -     amLODIPine (NORVASC) 5 MG tablet; Take 1 tablet by mouth Daily.  Dispense: 90 tablet; Refill: 1      Follow Up   No follow-ups on file.  Patient was given instructions and counseling regarding her condition or for health maintenance advice. Please see specific information pulled into the AVS if appropriate.

## 2023-02-17 NOTE — ASSESSMENT & PLAN NOTE
She does have a substantial murmur.  I could not find a copy of her most recent echo.  Asked her to inquire with the cardiologist.

## 2023-02-17 NOTE — ASSESSMENT & PLAN NOTE
Perhaps we will see if fatigue improves now that CPAP is in place.  We will check labs and her trial off of the atorvastatin and see if there is any effect.

## 2023-02-19 DIAGNOSIS — D64.9 ANEMIA, UNSPECIFIED TYPE: Primary | ICD-10-CM

## 2023-02-19 LAB
FERRITIN SERPL-MCNC: 52.6 NG/ML (ref 13–150)
IRON 24H UR-MRATE: 74 MCG/DL (ref 37–145)
IRON SATN MFR SERPL: 20 % (ref 20–50)
TIBC SERPL-MCNC: 368 MCG/DL (ref 298–536)
TRANSFERRIN SERPL-MCNC: 247 MG/DL (ref 200–360)

## 2023-02-20 DIAGNOSIS — D64.9 ANEMIA, UNSPECIFIED TYPE: Primary | ICD-10-CM

## 2023-02-24 ENCOUNTER — TELEPHONE (OUTPATIENT)
Dept: FAMILY MEDICINE CLINIC | Age: 73
End: 2023-02-24
Payer: MEDICARE

## 2023-03-17 ENCOUNTER — LAB (OUTPATIENT)
Dept: LAB | Facility: HOSPITAL | Age: 73
End: 2023-03-17
Payer: MEDICARE

## 2023-03-17 DIAGNOSIS — D64.9 ANEMIA, UNSPECIFIED TYPE: ICD-10-CM

## 2023-03-17 LAB
ALBUMIN SERPL-MCNC: 4.2 G/DL (ref 3.5–5.2)
ALBUMIN/GLOB SERPL: 1.6 G/DL
ALP SERPL-CCNC: 72 U/L (ref 39–117)
ALT SERPL W P-5'-P-CCNC: 28 U/L (ref 1–33)
ANION GAP SERPL CALCULATED.3IONS-SCNC: 11.6 MMOL/L (ref 5–15)
AST SERPL-CCNC: 28 U/L (ref 1–32)
BASOPHILS # BLD AUTO: 0.03 10*3/MM3 (ref 0–0.2)
BASOPHILS NFR BLD AUTO: 0.7 % (ref 0–1.5)
BILIRUB SERPL-MCNC: 0.4 MG/DL (ref 0–1.2)
BUN SERPL-MCNC: 37 MG/DL (ref 8–23)
BUN/CREAT SERPL: 22.7 (ref 7–25)
CALCIUM SPEC-SCNC: 9.5 MG/DL (ref 8.6–10.5)
CHLORIDE SERPL-SCNC: 103 MMOL/L (ref 98–107)
CO2 SERPL-SCNC: 24.4 MMOL/L (ref 22–29)
CREAT SERPL-MCNC: 1.63 MG/DL (ref 0.57–1)
DEPRECATED RDW RBC AUTO: 43.5 FL (ref 37–54)
EGFRCR SERPLBLD CKD-EPI 2021: 33.6 ML/MIN/1.73
EOSINOPHIL # BLD AUTO: 0.23 10*3/MM3 (ref 0–0.4)
EOSINOPHIL NFR BLD AUTO: 5.1 % (ref 0.3–6.2)
ERYTHROCYTE [DISTWIDTH] IN BLOOD BY AUTOMATED COUNT: 13.4 % (ref 12.3–15.4)
FERRITIN SERPL-MCNC: 85.1 NG/ML (ref 13–150)
FOLATE SERPL-MCNC: >20 NG/ML (ref 4.78–24.2)
GLOBULIN UR ELPH-MCNC: 2.6 GM/DL
GLUCOSE SERPL-MCNC: 92 MG/DL (ref 65–99)
HAPTOGLOB SERPL-MCNC: 174 MG/DL (ref 30–200)
HCT VFR BLD AUTO: 33.3 % (ref 34–46.6)
HEMOCCULT STL QL IA: POSITIVE
HGB BLD-MCNC: 11.4 G/DL (ref 12–15.9)
IMM GRANULOCYTES # BLD AUTO: 0.02 10*3/MM3 (ref 0–0.05)
IMM GRANULOCYTES NFR BLD AUTO: 0.4 % (ref 0–0.5)
IRON 24H UR-MRATE: 115 MCG/DL (ref 37–145)
IRON SATN MFR SERPL: 29 % (ref 20–50)
LDH SERPL-CCNC: 168 U/L (ref 135–214)
LYMPHOCYTES # BLD AUTO: 1.54 10*3/MM3 (ref 0.7–3.1)
LYMPHOCYTES NFR BLD AUTO: 34.3 % (ref 19.6–45.3)
MCH RBC QN AUTO: 29.9 PG (ref 26.6–33)
MCHC RBC AUTO-ENTMCNC: 34.2 G/DL (ref 31.5–35.7)
MCV RBC AUTO: 87.4 FL (ref 79–97)
MONOCYTES # BLD AUTO: 0.57 10*3/MM3 (ref 0.1–0.9)
MONOCYTES NFR BLD AUTO: 12.7 % (ref 5–12)
NEUTROPHILS NFR BLD AUTO: 2.1 10*3/MM3 (ref 1.7–7)
NEUTROPHILS NFR BLD AUTO: 46.8 % (ref 42.7–76)
PLATELET # BLD AUTO: 181 10*3/MM3 (ref 140–450)
PMV BLD AUTO: 9.6 FL (ref 6–12)
POTASSIUM SERPL-SCNC: 3.5 MMOL/L (ref 3.5–5.2)
PROT SERPL-MCNC: 6.8 G/DL (ref 6–8.5)
RBC # BLD AUTO: 3.81 10*6/MM3 (ref 3.77–5.28)
RETICS # AUTO: 0.05 10*6/MM3 (ref 0.02–0.13)
RETICS/RBC NFR AUTO: 1.24 % (ref 0.7–1.9)
SODIUM SERPL-SCNC: 139 MMOL/L (ref 136–145)
TIBC SERPL-MCNC: 392 MCG/DL (ref 298–536)
TRANSFERRIN SERPL-MCNC: 263 MG/DL (ref 200–360)
VIT B12 BLD-MCNC: 605 PG/ML (ref 211–946)
WBC NRBC COR # BLD: 4.49 10*3/MM3 (ref 3.4–10.8)

## 2023-03-17 PROCEDURE — 83540 ASSAY OF IRON: CPT

## 2023-03-17 PROCEDURE — 84466 ASSAY OF TRANSFERRIN: CPT

## 2023-03-17 PROCEDURE — 82274 ASSAY TEST FOR BLOOD FECAL: CPT

## 2023-03-17 PROCEDURE — 83615 LACTATE (LD) (LDH) ENZYME: CPT

## 2023-03-17 PROCEDURE — 82607 VITAMIN B-12: CPT

## 2023-03-17 PROCEDURE — 85025 COMPLETE CBC W/AUTO DIFF WBC: CPT

## 2023-03-17 PROCEDURE — 82746 ASSAY OF FOLIC ACID SERUM: CPT

## 2023-03-17 PROCEDURE — 82728 ASSAY OF FERRITIN: CPT

## 2023-03-17 PROCEDURE — 83010 ASSAY OF HAPTOGLOBIN QUANT: CPT

## 2023-03-17 PROCEDURE — 36415 COLL VENOUS BLD VENIPUNCTURE: CPT

## 2023-03-17 PROCEDURE — 80053 COMPREHEN METABOLIC PANEL: CPT

## 2023-03-17 PROCEDURE — 85045 AUTOMATED RETICULOCYTE COUNT: CPT

## 2023-03-18 DIAGNOSIS — N18.32 STAGE 3B CHRONIC KIDNEY DISEASE: Primary | ICD-10-CM

## 2023-04-14 ENCOUNTER — TELEPHONE (OUTPATIENT)
Dept: FAMILY MEDICINE CLINIC | Age: 73
End: 2023-04-14

## 2023-04-14 DIAGNOSIS — D64.9 ANEMIA, UNSPECIFIED TYPE: Primary | ICD-10-CM

## 2023-04-14 DIAGNOSIS — N18.31 STAGE 3A CHRONIC KIDNEY DISEASE: ICD-10-CM

## 2023-04-14 NOTE — TELEPHONE ENCOUNTER
Caller: Linda Mitchell    Relationship: Self    Best call back number: [phone]    What orders are you requesting (i.e. lab or imaging): REPEAT LABS    In what timeframe would the patient need to come in: BEFORE 4/20    Where will you receive your lab/imaging services:     Additional notes: PLEASE CALL PATIENT WHEN THE ORDERS ARE READY          
Inf pt  
Lab order placed  
no

## 2023-04-17 ENCOUNTER — LAB (OUTPATIENT)
Dept: LAB | Facility: HOSPITAL | Age: 73
End: 2023-04-17
Payer: MEDICARE

## 2023-04-17 DIAGNOSIS — D64.9 ANEMIA, UNSPECIFIED TYPE: ICD-10-CM

## 2023-04-17 DIAGNOSIS — N18.31 STAGE 3A CHRONIC KIDNEY DISEASE: ICD-10-CM

## 2023-04-17 LAB
ANION GAP SERPL CALCULATED.3IONS-SCNC: 8 MMOL/L (ref 5–15)
BASOPHILS # BLD AUTO: 0.03 10*3/MM3 (ref 0–0.2)
BASOPHILS NFR BLD AUTO: 0.7 % (ref 0–1.5)
BUN SERPL-MCNC: 31 MG/DL (ref 8–23)
BUN/CREAT SERPL: 23.5 (ref 7–25)
CALCIUM SPEC-SCNC: 8.9 MG/DL (ref 8.6–10.5)
CHLORIDE SERPL-SCNC: 103 MMOL/L (ref 98–107)
CO2 SERPL-SCNC: 26 MMOL/L (ref 22–29)
CREAT SERPL-MCNC: 1.32 MG/DL (ref 0.57–1)
DEPRECATED RDW RBC AUTO: 43.7 FL (ref 37–54)
EGFRCR SERPLBLD CKD-EPI 2021: 43.3 ML/MIN/1.73
EOSINOPHIL # BLD AUTO: 0.1 10*3/MM3 (ref 0–0.4)
EOSINOPHIL NFR BLD AUTO: 2.2 % (ref 0.3–6.2)
ERYTHROCYTE [DISTWIDTH] IN BLOOD BY AUTOMATED COUNT: 13.3 % (ref 12.3–15.4)
FERRITIN SERPL-MCNC: 67.7 NG/ML (ref 13–150)
GLUCOSE SERPL-MCNC: 87 MG/DL (ref 65–99)
HCT VFR BLD AUTO: 35.6 % (ref 34–46.6)
HGB BLD-MCNC: 11.9 G/DL (ref 12–15.9)
IMM GRANULOCYTES # BLD AUTO: 0.02 10*3/MM3 (ref 0–0.05)
IMM GRANULOCYTES NFR BLD AUTO: 0.4 % (ref 0–0.5)
IRON 24H UR-MRATE: 62 MCG/DL (ref 37–145)
IRON SATN MFR SERPL: 17 % (ref 20–50)
LYMPHOCYTES # BLD AUTO: 0.87 10*3/MM3 (ref 0.7–3.1)
LYMPHOCYTES NFR BLD AUTO: 19.4 % (ref 19.6–45.3)
MCH RBC QN AUTO: 29.8 PG (ref 26.6–33)
MCHC RBC AUTO-ENTMCNC: 33.4 G/DL (ref 31.5–35.7)
MCV RBC AUTO: 89.2 FL (ref 79–97)
MONOCYTES # BLD AUTO: 0.49 10*3/MM3 (ref 0.1–0.9)
MONOCYTES NFR BLD AUTO: 10.9 % (ref 5–12)
NEUTROPHILS NFR BLD AUTO: 2.98 10*3/MM3 (ref 1.7–7)
NEUTROPHILS NFR BLD AUTO: 66.4 % (ref 42.7–76)
PLATELET # BLD AUTO: 177 10*3/MM3 (ref 140–450)
PMV BLD AUTO: 9.8 FL (ref 6–12)
POTASSIUM SERPL-SCNC: 4.2 MMOL/L (ref 3.5–5.2)
RBC # BLD AUTO: 3.99 10*6/MM3 (ref 3.77–5.28)
RETICS # AUTO: 0.07 10*6/MM3 (ref 0.02–0.13)
RETICS/RBC NFR AUTO: 1.6 % (ref 0.7–1.9)
SODIUM SERPL-SCNC: 137 MMOL/L (ref 136–145)
TIBC SERPL-MCNC: 356 MCG/DL (ref 298–536)
TRANSFERRIN SERPL-MCNC: 239 MG/DL (ref 200–360)
WBC NRBC COR # BLD: 4.49 10*3/MM3 (ref 3.4–10.8)

## 2023-04-17 PROCEDURE — 83540 ASSAY OF IRON: CPT

## 2023-04-17 PROCEDURE — 85045 AUTOMATED RETICULOCYTE COUNT: CPT

## 2023-04-17 PROCEDURE — 85025 COMPLETE CBC W/AUTO DIFF WBC: CPT

## 2023-04-17 PROCEDURE — 80048 BASIC METABOLIC PNL TOTAL CA: CPT

## 2023-04-17 PROCEDURE — 36415 COLL VENOUS BLD VENIPUNCTURE: CPT

## 2023-04-17 PROCEDURE — 82728 ASSAY OF FERRITIN: CPT

## 2023-04-17 PROCEDURE — 84466 ASSAY OF TRANSFERRIN: CPT

## 2023-04-18 DIAGNOSIS — D50.0 IRON DEFICIENCY ANEMIA DUE TO CHRONIC BLOOD LOSS: ICD-10-CM

## 2023-04-18 DIAGNOSIS — N18.32 STAGE 3B CHRONIC KIDNEY DISEASE: Primary | ICD-10-CM

## 2023-05-04 ENCOUNTER — TELEPHONE (OUTPATIENT)
Dept: FAMILY MEDICINE CLINIC | Age: 73
End: 2023-05-04

## 2023-05-04 RX ORDER — AMLODIPINE BESYLATE 2.5 MG/1
2.5 TABLET ORAL DAILY
Qty: 30 TABLET | Refills: 5 | OUTPATIENT
Start: 2023-05-04

## 2023-05-04 NOTE — TELEPHONE ENCOUNTER
Caller: Linda Mitchell     Relationship: [unfilled]     Best call back number:[phone]    What is your medical concern? THE AMLODIPINE MEDICATION WAS INCREASED TO 5 MG AND THE PRESCRIPTION SHE RECEIVED YESTERDAY WAS THE 2.5 MG.  PLEASE ADVISE PATIENT AS TO WHAT TO DO.     How long has this issue been going on? YESTERDAY     Is your provider already aware of this issue? NOT SURE

## 2023-05-04 NOTE — TELEPHONE ENCOUNTER
Spoke to [facility], they said that the 2.5mg was not canceled out when we sent in the 5mg, so pt will double up on the 2.5mg and they are filling the 5mg for the pt.  Inf pt

## 2023-05-14 DIAGNOSIS — K21.9 GASTROESOPHAGEAL REFLUX DISEASE, UNSPECIFIED WHETHER ESOPHAGITIS PRESENT: ICD-10-CM

## 2023-05-15 RX ORDER — AMLODIPINE BESYLATE 5 MG/1
5 TABLET ORAL DAILY
Qty: 90 TABLET | Refills: 1 | OUTPATIENT
Start: 2023-05-15

## 2023-05-15 RX ORDER — PANTOPRAZOLE SODIUM 40 MG/1
40 TABLET, DELAYED RELEASE ORAL DAILY
Qty: 90 TABLET | Refills: 1 | OUTPATIENT
Start: 2023-05-15

## 2023-05-19 ENCOUNTER — TRANSCRIBE ORDERS (OUTPATIENT)
Dept: ADMINISTRATIVE | Facility: HOSPITAL | Age: 73
End: 2023-05-19
Payer: MEDICARE

## 2023-05-19 ENCOUNTER — LAB (OUTPATIENT)
Dept: LAB | Facility: HOSPITAL | Age: 73
End: 2023-05-19
Payer: MEDICARE

## 2023-05-19 DIAGNOSIS — E78.2 MIXED HYPERLIPIDEMIA: ICD-10-CM

## 2023-05-19 DIAGNOSIS — D50.0 IRON DEFICIENCY ANEMIA DUE TO CHRONIC BLOOD LOSS: ICD-10-CM

## 2023-05-19 DIAGNOSIS — N18.32 STAGE 3B CHRONIC KIDNEY DISEASE: ICD-10-CM

## 2023-05-19 DIAGNOSIS — E78.2 MIXED HYPERLIPIDEMIA: Primary | ICD-10-CM

## 2023-05-19 DIAGNOSIS — N18.30 STAGE 3 CHRONIC KIDNEY DISEASE, UNSPECIFIED WHETHER STAGE 3A OR 3B CKD: Primary | ICD-10-CM

## 2023-05-19 DIAGNOSIS — N18.30 STAGE 3 CHRONIC KIDNEY DISEASE, UNSPECIFIED WHETHER STAGE 3A OR 3B CKD: ICD-10-CM

## 2023-05-19 LAB
ALBUMIN SERPL-MCNC: 4.2 G/DL (ref 3.5–5.2)
ANION GAP SERPL CALCULATED.3IONS-SCNC: 10 MMOL/L (ref 5–15)
BACTERIA UR QL AUTO: ABNORMAL /HPF
BASOPHILS # BLD AUTO: 0.03 10*3/MM3 (ref 0–0.2)
BASOPHILS NFR BLD AUTO: 0.7 % (ref 0–1.5)
BILIRUB UR QL STRIP: NEGATIVE
BUN SERPL-MCNC: 33 MG/DL (ref 8–23)
BUN/CREAT SERPL: 27 (ref 7–25)
CALCIUM SPEC-SCNC: 9.6 MG/DL (ref 8.6–10.5)
CHLORIDE SERPL-SCNC: 104 MMOL/L (ref 98–107)
CHOLEST SERPL-MCNC: 252 MG/DL (ref 0–200)
CLARITY UR: ABNORMAL
CO2 SERPL-SCNC: 26 MMOL/L (ref 22–29)
COLOR UR: YELLOW
CREAT SERPL-MCNC: 1.22 MG/DL (ref 0.57–1)
CREAT UR-MCNC: 217 MG/DL
DEPRECATED RDW RBC AUTO: 41.8 FL (ref 37–54)
EGFRCR SERPLBLD CKD-EPI 2021: 47.5 ML/MIN/1.73
EOSINOPHIL # BLD AUTO: 0.12 10*3/MM3 (ref 0–0.4)
EOSINOPHIL NFR BLD AUTO: 2.8 % (ref 0.3–6.2)
ERYTHROCYTE [DISTWIDTH] IN BLOOD BY AUTOMATED COUNT: 12.7 % (ref 12.3–15.4)
FERRITIN SERPL-MCNC: 72.2 NG/ML (ref 13–150)
GLUCOSE SERPL-MCNC: 91 MG/DL (ref 65–99)
GLUCOSE UR STRIP-MCNC: NEGATIVE MG/DL
HCT VFR BLD AUTO: 33.6 % (ref 34–46.6)
HDLC SERPL-MCNC: 41 MG/DL (ref 40–60)
HGB BLD-MCNC: 11.2 G/DL (ref 12–15.9)
HGB UR QL STRIP.AUTO: ABNORMAL
IMM GRANULOCYTES # BLD AUTO: 0.01 10*3/MM3 (ref 0–0.05)
IMM GRANULOCYTES NFR BLD AUTO: 0.2 % (ref 0–0.5)
IRON 24H UR-MRATE: 86 MCG/DL (ref 37–145)
IRON SATN MFR SERPL: 22 % (ref 20–50)
KETONES UR QL STRIP: NEGATIVE
LDLC SERPL CALC-MCNC: 172 MG/DL (ref 0–100)
LDLC/HDLC SERPL: 4.12 {RATIO}
LEUKOCYTE ESTERASE UR QL STRIP.AUTO: ABNORMAL
LYMPHOCYTES # BLD AUTO: 1.47 10*3/MM3 (ref 0.7–3.1)
LYMPHOCYTES NFR BLD AUTO: 34.4 % (ref 19.6–45.3)
MCH RBC QN AUTO: 29.8 PG (ref 26.6–33)
MCHC RBC AUTO-ENTMCNC: 33.3 G/DL (ref 31.5–35.7)
MCV RBC AUTO: 89.4 FL (ref 79–97)
MONOCYTES # BLD AUTO: 0.49 10*3/MM3 (ref 0.1–0.9)
MONOCYTES NFR BLD AUTO: 11.5 % (ref 5–12)
MUCOUS THREADS URNS QL MICRO: ABNORMAL /HPF
NEUTROPHILS NFR BLD AUTO: 2.15 10*3/MM3 (ref 1.7–7)
NEUTROPHILS NFR BLD AUTO: 50.4 % (ref 42.7–76)
NITRITE UR QL STRIP: NEGATIVE
PH UR STRIP.AUTO: 6 [PH] (ref 5–8)
PHOSPHATE SERPL-MCNC: 3 MG/DL (ref 2.5–4.5)
PLATELET # BLD AUTO: 173 10*3/MM3 (ref 140–450)
PMV BLD AUTO: 10.1 FL (ref 6–12)
POTASSIUM SERPL-SCNC: 3.7 MMOL/L (ref 3.5–5.2)
PROT ?TM UR-MCNC: 10.2 MG/DL
PROT UR QL STRIP: NEGATIVE
PROT/CREAT UR: 0.05 MG/G{CREAT}
RBC # BLD AUTO: 3.76 10*6/MM3 (ref 3.77–5.28)
RBC # UR STRIP: ABNORMAL /HPF
REF LAB TEST METHOD: ABNORMAL
RETICS # AUTO: 0.05 10*6/MM3 (ref 0.02–0.13)
RETICS/RBC NFR AUTO: 1.47 % (ref 0.7–1.9)
SODIUM SERPL-SCNC: 140 MMOL/L (ref 136–145)
SP GR UR STRIP: 1.02 (ref 1–1.03)
SQUAMOUS #/AREA URNS HPF: ABNORMAL /HPF
TIBC SERPL-MCNC: 392 MCG/DL (ref 298–536)
TRANSFERRIN SERPL-MCNC: 263 MG/DL (ref 200–360)
TRIGL SERPL-MCNC: 210 MG/DL (ref 0–150)
UROBILINOGEN UR QL STRIP: ABNORMAL
VLDLC SERPL-MCNC: 39 MG/DL (ref 5–40)
WBC # UR STRIP: ABNORMAL /HPF
WBC NRBC COR # BLD: 4.27 10*3/MM3 (ref 3.4–10.8)

## 2023-05-19 PROCEDURE — 36415 COLL VENOUS BLD VENIPUNCTURE: CPT

## 2023-05-19 PROCEDURE — 80069 RENAL FUNCTION PANEL: CPT

## 2023-05-19 PROCEDURE — 80061 LIPID PANEL: CPT

## 2023-05-19 PROCEDURE — 82570 ASSAY OF URINE CREATININE: CPT

## 2023-05-19 PROCEDURE — 82728 ASSAY OF FERRITIN: CPT

## 2023-05-19 PROCEDURE — 84466 ASSAY OF TRANSFERRIN: CPT

## 2023-05-19 PROCEDURE — 85025 COMPLETE CBC W/AUTO DIFF WBC: CPT

## 2023-05-19 PROCEDURE — 85045 AUTOMATED RETICULOCYTE COUNT: CPT

## 2023-05-19 PROCEDURE — 83540 ASSAY OF IRON: CPT

## 2023-05-19 PROCEDURE — 81001 URINALYSIS AUTO W/SCOPE: CPT

## 2023-05-19 PROCEDURE — 84156 ASSAY OF PROTEIN URINE: CPT

## 2023-08-08 DIAGNOSIS — I10 HYPERTENSION, ESSENTIAL: ICD-10-CM

## 2023-08-08 RX ORDER — LOSARTAN POTASSIUM AND HYDROCHLOROTHIAZIDE 25; 100 MG/1; MG/1
TABLET ORAL
Qty: 90 TABLET | Refills: 1 | Status: SHIPPED | OUTPATIENT
Start: 2023-08-08

## 2023-08-14 DIAGNOSIS — K21.9 GASTROESOPHAGEAL REFLUX DISEASE, UNSPECIFIED WHETHER ESOPHAGITIS PRESENT: ICD-10-CM

## 2023-08-14 RX ORDER — PANTOPRAZOLE SODIUM 40 MG/1
40 TABLET, DELAYED RELEASE ORAL DAILY
Qty: 90 TABLET | Refills: 1 | Status: SHIPPED | OUTPATIENT
Start: 2023-08-14

## 2023-08-21 ENCOUNTER — OFFICE VISIT (OUTPATIENT)
Dept: FAMILY MEDICINE CLINIC | Age: 73
End: 2023-08-21
Payer: MEDICARE

## 2023-08-21 ENCOUNTER — LAB (OUTPATIENT)
Dept: LAB | Facility: HOSPITAL | Age: 73
End: 2023-08-21
Payer: MEDICARE

## 2023-08-21 VITALS
OXYGEN SATURATION: 99 % | WEIGHT: 143.2 LBS | BODY MASS INDEX: 25.37 KG/M2 | HEIGHT: 63 IN | SYSTOLIC BLOOD PRESSURE: 129 MMHG | DIASTOLIC BLOOD PRESSURE: 75 MMHG | HEART RATE: 50 BPM

## 2023-08-21 DIAGNOSIS — N18.31 STAGE 3A CHRONIC KIDNEY DISEASE: ICD-10-CM

## 2023-08-21 DIAGNOSIS — N18.31 STAGE 3A CHRONIC KIDNEY DISEASE: Primary | ICD-10-CM

## 2023-08-21 DIAGNOSIS — D64.9 ANEMIA, UNSPECIFIED TYPE: ICD-10-CM

## 2023-08-21 DIAGNOSIS — Z00.00 MEDICARE ANNUAL WELLNESS VISIT, SUBSEQUENT: Primary | ICD-10-CM

## 2023-08-21 DIAGNOSIS — H53.9 VISION CHANGES: ICD-10-CM

## 2023-08-21 DIAGNOSIS — I10 HYPERTENSION, ESSENTIAL: ICD-10-CM

## 2023-08-21 DIAGNOSIS — M85.89 OSTEOPENIA OF MULTIPLE SITES: ICD-10-CM

## 2023-08-21 DIAGNOSIS — Z23 NEED FOR VACCINATION: ICD-10-CM

## 2023-08-21 DIAGNOSIS — I25.10 ATHEROSCLEROSIS OF NATIVE CORONARY ARTERY OF NATIVE HEART WITHOUT ANGINA PECTORIS: ICD-10-CM

## 2023-08-21 DIAGNOSIS — Z78.0 POSTMENOPAUSAL STATE: ICD-10-CM

## 2023-08-21 DIAGNOSIS — Z12.31 ENCOUNTER FOR SCREENING MAMMOGRAM FOR MALIGNANT NEOPLASM OF BREAST: ICD-10-CM

## 2023-08-21 DIAGNOSIS — E78.2 MIXED HYPERLIPIDEMIA: ICD-10-CM

## 2023-08-21 LAB
ALBUMIN SERPL-MCNC: 3.9 G/DL (ref 3.5–5.2)
ALBUMIN/GLOB SERPL: 1.4 G/DL
ALP SERPL-CCNC: 76 U/L (ref 39–117)
ALT SERPL W P-5'-P-CCNC: 21 U/L (ref 1–33)
ANION GAP SERPL CALCULATED.3IONS-SCNC: 11.3 MMOL/L (ref 5–15)
AST SERPL-CCNC: 25 U/L (ref 1–32)
BASOPHILS # BLD AUTO: 0.02 10*3/MM3 (ref 0–0.2)
BASOPHILS NFR BLD AUTO: 0.4 % (ref 0–1.5)
BILIRUB SERPL-MCNC: 0.5 MG/DL (ref 0–1.2)
BUN SERPL-MCNC: 27 MG/DL (ref 8–23)
BUN/CREAT SERPL: 18.2 (ref 7–25)
CALCIUM SPEC-SCNC: 9.4 MG/DL (ref 8.6–10.5)
CHLORIDE SERPL-SCNC: 104 MMOL/L (ref 98–107)
CHOLEST SERPL-MCNC: 147 MG/DL (ref 0–200)
CO2 SERPL-SCNC: 24.7 MMOL/L (ref 22–29)
CREAT SERPL-MCNC: 1.48 MG/DL (ref 0.57–1)
DEPRECATED RDW RBC AUTO: 42.9 FL (ref 37–54)
EGFRCR SERPLBLD CKD-EPI 2021: 37.7 ML/MIN/1.73
EOSINOPHIL # BLD AUTO: 0.12 10*3/MM3 (ref 0–0.4)
EOSINOPHIL NFR BLD AUTO: 2.2 % (ref 0.3–6.2)
ERYTHROCYTE [DISTWIDTH] IN BLOOD BY AUTOMATED COUNT: 12.9 % (ref 12.3–15.4)
FERRITIN SERPL-MCNC: 112 NG/ML (ref 13–150)
GLOBULIN UR ELPH-MCNC: 2.7 GM/DL
GLUCOSE SERPL-MCNC: 107 MG/DL (ref 65–99)
HCT VFR BLD AUTO: 31.4 % (ref 34–46.6)
HDLC SERPL-MCNC: 45 MG/DL (ref 40–60)
HGB BLD-MCNC: 10.6 G/DL (ref 12–15.9)
IMM GRANULOCYTES # BLD AUTO: 0.02 10*3/MM3 (ref 0–0.05)
IMM GRANULOCYTES NFR BLD AUTO: 0.4 % (ref 0–0.5)
IRON 24H UR-MRATE: 45 MCG/DL (ref 37–145)
IRON SATN MFR SERPL: 13 % (ref 20–50)
LDLC SERPL CALC-MCNC: 81 MG/DL (ref 0–100)
LDLC/HDLC SERPL: 1.74 {RATIO}
LYMPHOCYTES # BLD AUTO: 0.94 10*3/MM3 (ref 0.7–3.1)
LYMPHOCYTES NFR BLD AUTO: 17.4 % (ref 19.6–45.3)
MCH RBC QN AUTO: 30 PG (ref 26.6–33)
MCHC RBC AUTO-ENTMCNC: 33.8 G/DL (ref 31.5–35.7)
MCV RBC AUTO: 89 FL (ref 79–97)
MONOCYTES # BLD AUTO: 0.7 10*3/MM3 (ref 0.1–0.9)
MONOCYTES NFR BLD AUTO: 13 % (ref 5–12)
NEUTROPHILS NFR BLD AUTO: 3.6 10*3/MM3 (ref 1.7–7)
NEUTROPHILS NFR BLD AUTO: 66.6 % (ref 42.7–76)
PLATELET # BLD AUTO: 142 10*3/MM3 (ref 140–450)
PMV BLD AUTO: 9.6 FL (ref 6–12)
POTASSIUM SERPL-SCNC: 3.8 MMOL/L (ref 3.5–5.2)
PROT SERPL-MCNC: 6.6 G/DL (ref 6–8.5)
RBC # BLD AUTO: 3.53 10*6/MM3 (ref 3.77–5.28)
SODIUM SERPL-SCNC: 140 MMOL/L (ref 136–145)
TIBC SERPL-MCNC: 340 MCG/DL (ref 298–536)
TRANSFERRIN SERPL-MCNC: 228 MG/DL (ref 200–360)
TRIGL SERPL-MCNC: 118 MG/DL (ref 0–150)
VLDLC SERPL-MCNC: 21 MG/DL (ref 5–40)
WBC NRBC COR # BLD: 5.4 10*3/MM3 (ref 3.4–10.8)

## 2023-08-21 PROCEDURE — 3078F DIAST BP <80 MM HG: CPT | Performed by: FAMILY MEDICINE

## 2023-08-21 PROCEDURE — 85025 COMPLETE CBC W/AUTO DIFF WBC: CPT | Performed by: FAMILY MEDICINE

## 2023-08-21 PROCEDURE — 3074F SYST BP LT 130 MM HG: CPT | Performed by: FAMILY MEDICINE

## 2023-08-21 PROCEDURE — 82728 ASSAY OF FERRITIN: CPT | Performed by: FAMILY MEDICINE

## 2023-08-21 PROCEDURE — 83540 ASSAY OF IRON: CPT | Performed by: FAMILY MEDICINE

## 2023-08-21 PROCEDURE — 84466 ASSAY OF TRANSFERRIN: CPT | Performed by: FAMILY MEDICINE

## 2023-08-21 PROCEDURE — 85045 AUTOMATED RETICULOCYTE COUNT: CPT | Performed by: FAMILY MEDICINE

## 2023-08-21 PROCEDURE — G0439 PPPS, SUBSEQ VISIT: HCPCS | Performed by: FAMILY MEDICINE

## 2023-08-21 PROCEDURE — 80053 COMPREHEN METABOLIC PANEL: CPT | Performed by: FAMILY MEDICINE

## 2023-08-21 PROCEDURE — 99214 OFFICE O/P EST MOD 30 MIN: CPT | Performed by: FAMILY MEDICINE

## 2023-08-21 PROCEDURE — 1170F FXNL STATUS ASSESSED: CPT | Performed by: FAMILY MEDICINE

## 2023-08-21 PROCEDURE — 36415 COLL VENOUS BLD VENIPUNCTURE: CPT | Performed by: FAMILY MEDICINE

## 2023-08-21 PROCEDURE — 80061 LIPID PANEL: CPT | Performed by: FAMILY MEDICINE

## 2023-08-21 RX ORDER — MULTIVITAMIN
1 CAPSULE ORAL DAILY
COMMUNITY

## 2023-08-21 RX ORDER — FERROUS SULFATE 325(65) MG
325 TABLET ORAL
COMMUNITY

## 2023-08-21 NOTE — PROGRESS NOTES
The ABCs of the Annual Wellness Visit  Subsequent Medicare Wellness Visit    Subjective    Linda Mitchell is a 72 y.o. female who presents for a Subsequent Medicare Wellness Visit.    The following portions of the patient's history were reviewed and   updated as appropriate: allergies, current medications, past family history, past medical history, past social history, past surgical history, and problem list.    Compared to one year ago, the patient feels her physical   health is the same. Other than taking a little longer to get things done.     Compared to one year ago, the patient feels her mental   health is the same.    Recent Hospitalizations:  She was not admitted to the hospital during the last year.       Current Medical Providers:  Patient Care Team:  Hudson Leon MD as PCP - General (Family Medicine)  Robert Vincent MD (General Surgery)    Outpatient Medications Prior to Visit   Medication Sig Dispense Refill    Acetaminophen (TYLENOL 8 HOUR PO) Take  by mouth. PRN      amLODIPine (NORVASC) 5 MG tablet Take 1 tablet by mouth Daily. 90 tablet 1    aspirin 81 MG EC tablet Take 1 tablet by mouth Daily.      aspirin-acetaminophen-caffeine (EXCEDRIN MIGRAINE) 250-250-65 MG per tablet Take 1 tablet by mouth Every 6 (Six) Hours As Needed for Headache.      atorvastatin (LIPITOR) 80 MG tablet TAKE 1/2 TABLET BY MOUTH DAILY 45 tablet 1    clopidogrel (PLAVIX) 75 MG tablet Take 1 tablet by mouth Daily.      esomeprazole (nexIUM) 20 MG capsule Take 1 capsule by mouth.      famotidine (PEPCID) 10 MG tablet Take 1 tablet by mouth As Needed for Heartburn.      ferrous sulfate 325 (65 FE) MG tablet Take 1 tablet by mouth.      isosorbide mononitrate (IMDUR) 30 MG 24 hr tablet       losartan-hydrochlorothiazide (HYZAAR) 100-25 MG per tablet TAKE 1 TABLET BY MOUTH EVERY DAY 90 tablet 1    metoprolol tartrate (LOPRESSOR) 25 MG tablet Take 1 tablet by mouth Daily.      Multiple Vitamin (multivitamin)  "capsule Take 1 capsule by mouth Daily.      Multiple Vitamins-Minerals (ONE-A-DAY WOMENS 50+ PO) Take  by mouth Daily.      pantoprazole (PROTONIX) 40 MG EC tablet TAKE 1 TABLET BY MOUTH DAILY 90 tablet 1    Vitamin E 450 MG capsule Take  by mouth.       No facility-administered medications prior to visit.       No opioid medication identified on active medication list. I have reviewed chart for other potential  high risk medication/s and harmful drug interactions in the elderly.        Aspirin is on active medication list. Aspirin use is indicated based on review of current medical condition/s. Pros and cons of this therapy have been discussed today. Benefits of this medication outweigh potential harm.  Patient has been encouraged to continue taking this medication.  .      Patient Active Problem List   Diagnosis    Atherosclerosis of native coronary artery of native heart without angina pectoris    History of non-ST elevation myocardial infarction (NSTEMI)    Hypertension, essential    Mixed hyperlipidemia    Anemia, unspecified    Osteopenia of multiple sites    Encounter for screening mammogram for malignant neoplasm of breast    Colon cancer screening    Medicare annual wellness visit, subsequent    Actinic keratoses    GERD (gastroesophageal reflux disease)    Raynaud's disease without gangrene    Stage 3a chronic kidney disease    Sleep disturbance    Murmur    Sleep apnea    Fatigue    vaccination    Vision changes     Advance Care Planning   Advance Care Planning     Advance Directive is not on file.  ACP discussion was held with the patient during this visit. Patient does not have an advance directive, information provided.     Objective    Vitals:    08/21/23 0822   BP: 129/75   BP Location: Left arm   Patient Position: Sitting   Cuff Size: Adult   Pulse: 50   SpO2: 99%  Comment: room air   Weight: 65 kg (143 lb 3.2 oz)   Height: 160 cm (62.99\")     Estimated body mass index is 25.37 kg/mý as calculated " "from the following:    Height as of this encounter: 160 cm (62.99\").    Weight as of this encounter: 65 kg (143 lb 3.2 oz).    BMI is >= 25 and <30. (Overweight) The following options were offered after discussion;: exercise counseling/recommendations      Does the patient have evidence of cognitive impairment? No    Lab Results   Component Value Date    TRIG 118 2023    HDL 45 2023    LDL 81 2023    VLDL 21 2023        HEALTH RISK ASSESSMENT    Smoking Status:  Social History     Tobacco Use   Smoking Status Never   Smokeless Tobacco Never     Alcohol Consumption:  Social History     Substance and Sexual Activity   Alcohol Use Not Currently     Fall Risk Screen:    STEADI Fall Risk Assessment was completed, and patient is at LOW risk for falls.Assessment completed on:2023    Depression Screenin/21/2023     8:27 AM   PHQ-2/PHQ-9 Depression Screening   Little Interest or Pleasure in Doing Things 0-->not at all   Feeling Down, Depressed or Hopeless 0-->not at all   PHQ-9: Brief Depression Severity Measure Score 0       Health Habits and Functional and Cognitive Screenin/21/2023     8:28 AM   Functional & Cognitive Status   Do you have difficulty preparing food and eating? No   Do you have difficulty bathing yourself, getting dressed or grooming yourself? No   Do you have difficulty using the toilet? No   Do you have difficulty moving around from place to place? No   Do you have trouble with steps or getting out of a bed or a chair? No   Current Diet Well Balanced Diet   Dental Exam Up to date   Eye Exam Not up to date   Exercise (times per week) 3 times per week   Current Exercises Include Home Exercise Program (TV, Computer, Etc.)   Do you need help using the phone?  No   Are you deaf or do you have serious difficulty hearing?  No   Do you need help to go to places out of walking distance? No   Do you need help shopping? No   Do you need help preparing meals?  No   Do " you need help with housework?  No   Do you need help with laundry? No   Do you need help taking your medications? No   Do you need help managing money? No   Do you ever drive or ride in a car without wearing a seat belt? No   Have you felt unusual stress, anger or loneliness in the last month? No   Who do you live with? Spouse   If you need help, do you have trouble finding someone available to you? No   Have you been bothered in the last four weeks by sexual problems? No   Do you have difficulty concentrating, remembering or making decisions? Yes       Age-appropriate Screening Schedule:  Refer to the list below for future screening recommendations based on patient's age, sex and/or medical conditions. Orders for these recommended tests are listed in the plan section. The patient has been provided with a written plan.    Health Maintenance   Topic Date Due    TDAP/TD VACCINES (1 - Tdap) Never done    ZOSTER VACCINE (1 of 2) Never done    COVID-19 Vaccine (5 - Mixed Product series) 02/25/2022    DXA SCAN  09/20/2023    INFLUENZA VACCINE  10/01/2023    ANNUAL WELLNESS VISIT  08/21/2024    LIPID PANEL  08/21/2024    MAMMOGRAM  10/11/2024    COLORECTAL CANCER SCREENING  04/26/2032    HEPATITIS C SCREENING  Completed    Pneumococcal Vaccine 65+  Completed                  CMS Preventative Services Quick Reference  Risk Factors Identified During Encounter  Chronic Pain:  try Tylenol and can refer to PT if she would like.   The above risks/problems have been discussed with the patient.  Pertinent information has been shared with the patient in the After Visit Summary.  An After Visit Summary and PPPS were made available to the patient.    Follow Up:   Next Medicare Wellness visit to be scheduled in 1 year.       Additional E&M Note during same encounter follows:  Patient has multiple medical problems which are significant and separately identifiable that require additional work above and beyond the Medicare Wellness  "Visit.      Chief Complaint  Medicare Wellness-subsequent    Subjective        HPI  Linda Mitchell is also being seen today for other health issues as noted below    Her blood pressure looks good today.  She is tolerating her current medications.    She is still taking the atorvastatin for the high cholesterol.  Tolerating it okay at present.      Has history of prior myocardial infarction/coronary artery disease.  She did see cardiology, , in May and was found to be stable.  Is to follow-up with him again at 1 year.    She also saw nephrology in May with underlying CKD stage IIIa    She reports that on August 15 noted a change in her vision that she describes as kind of a reflection half-moon shaped noticeable from the right eye.  She does have prior history of LASEK surgery.  She has an appointment with her eye doctor on September 5.             Objective   Vital Signs:  /75 (BP Location: Left arm, Patient Position: Sitting, Cuff Size: Adult)   Pulse 50   Ht 160 cm (62.99\")   Wt 65 kg (143 lb 3.2 oz)   SpO2 99% Comment: room air  BMI 25.37 kg/mý     Physical Exam   No acute distress  Color is good  Tympanic membranes are clear  Oropharynx is clear  No cervical or supraclavicular adenopathy  Heart regular rate and rhythm no murmur  Lungs are bilaterally clear  Abdomen bowel sounds positive, soft, nontender no mass, no organomegaly  Lower extremities without edema  No gross lateralizing focal neurologic deficit  She has some seborrheic keratoses on her back but no worrisome skin lesions.                     Assessment and Plan   Diagnoses and all orders for this visit:    1. Medicare annual wellness visit, subsequent (Primary)  Assessment & Plan:  We reviewed the preventive service recommendations and created an individualized handout.  Also recommended that she see a dentist.      2. Hypertension, essential  Assessment & Plan:  Blood pressure looks good continue on current regimen    Orders:  -  "    CBC & Differential    3. Mixed hyperlipidemia  -     Comprehensive Metabolic Panel  -     Lipid Panel    4. Atherosclerosis of native coronary artery of native heart without angina pectoris  Assessment & Plan:  Follow risk factor reduction strategies      5. Stage 3a chronic kidney disease  -     Iron Profile  -     Ferritin  -     Reticulocytes    6. Osteopenia of multiple sites  -     DEXA Bone Density Axial; Future    7. vaccination  Assessment & Plan:  Informed of availability of shingles vaccine and Tdap at the pharmacy       8. Postmenopausal state  -     DEXA Bone Density Axial; Future    9. Encounter for screening mammogram for malignant neoplasm of breast  Comments:  We will get her scheduled for mammogram.  Says she will hold off on breast exam to after her mammo  Orders:  -     Mammo Screening Digital Tomosynthesis Bilateral With CAD; Future    10. Anemia, unspecified type  -     Iron Profile  -     Ferritin  -     Reticulocytes    11. Vision changes  Assessment & Plan:  Since her vision change was rather sudden in nature think it might be wise for her to go on walk into the eye doctor office and try to be seen earlier.  Concern whether she might have a retinal detachment or something of that nature.               Follow Up   No follow-ups on file.  Patient was given instructions and counseling regarding her condition or for health maintenance advice. Please see specific information pulled into the AVS if appropriate.

## 2023-08-21 NOTE — ASSESSMENT & PLAN NOTE
We reviewed the preventive service recommendations and created an individualized handout.  Also recommended that she see a dentist.

## 2023-08-21 NOTE — ASSESSMENT & PLAN NOTE
Since her vision change was rather sudden in nature think it might be wise for her to go on walk into the eye doctor office and try to be seen earlier.  Concern whether she might have a retinal detachment or something of that nature.

## 2023-08-22 LAB
RETICS # AUTO: 0.06 10*6/MM3 (ref 0.02–0.13)
RETICS/RBC NFR AUTO: 1.58 % (ref 0.7–1.9)

## 2023-08-30 RX ORDER — AMLODIPINE BESYLATE 5 MG/1
5 TABLET ORAL DAILY
Qty: 90 TABLET | Refills: 1 | Status: SHIPPED | OUTPATIENT
Start: 2023-08-30

## 2023-09-25 RX ORDER — ATORVASTATIN CALCIUM 80 MG/1
TABLET, FILM COATED ORAL
Qty: 45 TABLET | Refills: 1 | Status: SHIPPED | OUTPATIENT
Start: 2023-09-25

## 2023-11-17 DIAGNOSIS — K21.9 GASTROESOPHAGEAL REFLUX DISEASE, UNSPECIFIED WHETHER ESOPHAGITIS PRESENT: ICD-10-CM

## 2023-11-22 RX ORDER — PANTOPRAZOLE SODIUM 40 MG/1
40 TABLET, DELAYED RELEASE ORAL DAILY
Qty: 90 TABLET | Refills: 0 | Status: SHIPPED | OUTPATIENT
Start: 2023-11-22

## 2023-12-05 ENCOUNTER — TELEPHONE (OUTPATIENT)
Dept: FAMILY MEDICINE CLINIC | Age: 73
End: 2023-12-05
Payer: MEDICARE

## 2023-12-05 DIAGNOSIS — D50.0 IRON DEFICIENCY ANEMIA DUE TO CHRONIC BLOOD LOSS: ICD-10-CM

## 2023-12-05 DIAGNOSIS — E78.2 MIXED HYPERLIPIDEMIA: Primary | ICD-10-CM

## 2023-12-05 DIAGNOSIS — I10 HYPERTENSION, ESSENTIAL: ICD-10-CM

## 2023-12-05 DIAGNOSIS — N18.31 STAGE 3A CHRONIC KIDNEY DISEASE: ICD-10-CM

## 2023-12-05 DIAGNOSIS — D50.9 IRON DEFICIENCY ANEMIA, UNSPECIFIED IRON DEFICIENCY ANEMIA TYPE: ICD-10-CM

## 2023-12-05 NOTE — TELEPHONE ENCOUNTER
Pt is coming in on Tuesday for her mammo and DEXA.  While she is here she would like to do labs to check her anemia and kidney fct so she can see if her iron is working and if she needs to get back in with her nephrology.  Please advise.

## 2023-12-12 ENCOUNTER — HOSPITAL ENCOUNTER (OUTPATIENT)
Dept: MAMMOGRAPHY | Facility: HOSPITAL | Age: 73
Discharge: HOME | End: 2023-12-12
Payer: MEDICARE

## 2023-12-12 ENCOUNTER — LAB (OUTPATIENT)
Dept: LAB | Facility: HOSPITAL | Age: 73
End: 2023-12-12
Payer: MEDICARE

## 2023-12-12 ENCOUNTER — HOSPITAL ENCOUNTER (OUTPATIENT)
Dept: BONE DENSITY | Facility: HOSPITAL | Age: 73
Discharge: HOME | End: 2023-12-12
Payer: MEDICARE

## 2023-12-12 DIAGNOSIS — D50.9 IRON DEFICIENCY ANEMIA, UNSPECIFIED IRON DEFICIENCY ANEMIA TYPE: ICD-10-CM

## 2023-12-12 DIAGNOSIS — R92.8 ABNORMAL MAMMOGRAM: Primary | ICD-10-CM

## 2023-12-12 DIAGNOSIS — M85.89 OSTEOPENIA OF MULTIPLE SITES: ICD-10-CM

## 2023-12-12 DIAGNOSIS — E78.2 MIXED HYPERLIPIDEMIA: ICD-10-CM

## 2023-12-12 DIAGNOSIS — N18.31 STAGE 3A CHRONIC KIDNEY DISEASE: ICD-10-CM

## 2023-12-12 DIAGNOSIS — Z12.31 ENCOUNTER FOR SCREENING MAMMOGRAM FOR MALIGNANT NEOPLASM OF BREAST: ICD-10-CM

## 2023-12-12 DIAGNOSIS — Z78.0 POSTMENOPAUSAL STATE: ICD-10-CM

## 2023-12-12 DIAGNOSIS — I10 HYPERTENSION, ESSENTIAL: ICD-10-CM

## 2023-12-12 LAB
ALBUMIN SERPL-MCNC: 4 G/DL (ref 3.5–5.2)
ALBUMIN/GLOB SERPL: 1.6 G/DL
ALP SERPL-CCNC: 74 U/L (ref 39–117)
ALT SERPL W P-5'-P-CCNC: 15 U/L (ref 1–33)
ANION GAP SERPL CALCULATED.3IONS-SCNC: 11 MMOL/L (ref 5–15)
AST SERPL-CCNC: 17 U/L (ref 1–32)
BASOPHILS # BLD AUTO: 0.03 10*3/MM3 (ref 0–0.2)
BASOPHILS NFR BLD AUTO: 0.6 % (ref 0–1.5)
BILIRUB SERPL-MCNC: 0.2 MG/DL (ref 0–1.2)
BUN SERPL-MCNC: 29 MG/DL (ref 8–23)
BUN/CREAT SERPL: 23.4 (ref 7–25)
CALCIUM SPEC-SCNC: 9.9 MG/DL (ref 8.6–10.5)
CHLORIDE SERPL-SCNC: 104 MMOL/L (ref 98–107)
CHOLEST SERPL-MCNC: 161 MG/DL (ref 0–200)
CO2 SERPL-SCNC: 25 MMOL/L (ref 22–29)
CREAT SERPL-MCNC: 1.24 MG/DL (ref 0.57–1)
DEPRECATED RDW RBC AUTO: 40.5 FL (ref 37–54)
EGFRCR SERPLBLD CKD-EPI 2021: 46.3 ML/MIN/1.73
EOSINOPHIL # BLD AUTO: 0.11 10*3/MM3 (ref 0–0.4)
EOSINOPHIL NFR BLD AUTO: 2.2 % (ref 0.3–6.2)
ERYTHROCYTE [DISTWIDTH] IN BLOOD BY AUTOMATED COUNT: 12.2 % (ref 12.3–15.4)
FERRITIN SERPL-MCNC: 76.2 NG/ML (ref 13–150)
FOLATE SERPL-MCNC: 17.3 NG/ML (ref 4.78–24.2)
GLOBULIN UR ELPH-MCNC: 2.5 GM/DL
GLUCOSE SERPL-MCNC: 96 MG/DL (ref 65–99)
HCT VFR BLD AUTO: 33.1 % (ref 34–46.6)
HDLC SERPL-MCNC: 41 MG/DL (ref 40–60)
HGB BLD-MCNC: 11.1 G/DL (ref 12–15.9)
IMM GRANULOCYTES # BLD AUTO: 0.02 10*3/MM3 (ref 0–0.05)
IMM GRANULOCYTES NFR BLD AUTO: 0.4 % (ref 0–0.5)
IRON 24H UR-MRATE: 75 MCG/DL (ref 37–145)
IRON SATN MFR SERPL: 21 % (ref 20–50)
LDLC SERPL CALC-MCNC: 94 MG/DL (ref 0–100)
LDLC/HDLC SERPL: 2.21 {RATIO}
LYMPHOCYTES # BLD AUTO: 1.42 10*3/MM3 (ref 0.7–3.1)
LYMPHOCYTES NFR BLD AUTO: 28.9 % (ref 19.6–45.3)
MCH RBC QN AUTO: 30.2 PG (ref 26.6–33)
MCHC RBC AUTO-ENTMCNC: 33.5 G/DL (ref 31.5–35.7)
MCV RBC AUTO: 90.2 FL (ref 79–97)
MONOCYTES # BLD AUTO: 0.49 10*3/MM3 (ref 0.1–0.9)
MONOCYTES NFR BLD AUTO: 10 % (ref 5–12)
NEUTROPHILS NFR BLD AUTO: 2.85 10*3/MM3 (ref 1.7–7)
NEUTROPHILS NFR BLD AUTO: 57.9 % (ref 42.7–76)
PLATELET # BLD AUTO: 166 10*3/MM3 (ref 140–450)
PMV BLD AUTO: 10.1 FL (ref 6–12)
POTASSIUM SERPL-SCNC: 3.9 MMOL/L (ref 3.5–5.2)
PROT SERPL-MCNC: 6.5 G/DL (ref 6–8.5)
RBC # BLD AUTO: 3.67 10*6/MM3 (ref 3.77–5.28)
RETICS # AUTO: 0.05 10*6/MM3 (ref 0.02–0.13)
RETICS/RBC NFR AUTO: 1.53 % (ref 0.7–1.9)
SODIUM SERPL-SCNC: 140 MMOL/L (ref 136–145)
TIBC SERPL-MCNC: 355 MCG/DL (ref 298–536)
TRANSFERRIN SERPL-MCNC: 238 MG/DL (ref 200–360)
TRIGL SERPL-MCNC: 147 MG/DL (ref 0–150)
VIT B12 BLD-MCNC: 557 PG/ML (ref 211–946)
VLDLC SERPL-MCNC: 26 MG/DL (ref 5–40)
WBC NRBC COR # BLD AUTO: 4.92 10*3/MM3 (ref 3.4–10.8)

## 2023-12-12 PROCEDURE — 85025 COMPLETE CBC W/AUTO DIFF WBC: CPT

## 2023-12-12 PROCEDURE — 77067 SCR MAMMO BI INCL CAD: CPT

## 2023-12-12 PROCEDURE — 80061 LIPID PANEL: CPT

## 2023-12-12 PROCEDURE — 85045 AUTOMATED RETICULOCYTE COUNT: CPT

## 2023-12-12 PROCEDURE — 82607 VITAMIN B-12: CPT

## 2023-12-12 PROCEDURE — 77080 DXA BONE DENSITY AXIAL: CPT

## 2023-12-12 PROCEDURE — 84466 ASSAY OF TRANSFERRIN: CPT

## 2023-12-12 PROCEDURE — 82728 ASSAY OF FERRITIN: CPT

## 2023-12-12 PROCEDURE — 36415 COLL VENOUS BLD VENIPUNCTURE: CPT

## 2023-12-12 PROCEDURE — 77063 BREAST TOMOSYNTHESIS BI: CPT

## 2023-12-12 PROCEDURE — 82746 ASSAY OF FOLIC ACID SERUM: CPT

## 2023-12-12 PROCEDURE — 83540 ASSAY OF IRON: CPT

## 2023-12-12 PROCEDURE — 80053 COMPREHEN METABOLIC PANEL: CPT

## 2023-12-27 ENCOUNTER — HOSPITAL ENCOUNTER (OUTPATIENT)
Dept: ULTRASOUND IMAGING | Facility: HOSPITAL | Age: 73
Discharge: HOME | End: 2023-12-27
Payer: MEDICARE

## 2023-12-27 ENCOUNTER — HOSPITAL ENCOUNTER (OUTPATIENT)
Dept: MAMMOGRAPHY | Facility: HOSPITAL | Age: 73
Discharge: HOME | End: 2023-12-27
Payer: MEDICARE

## 2023-12-27 DIAGNOSIS — R92.8 ABNORMAL MAMMOGRAM: ICD-10-CM

## 2023-12-27 PROCEDURE — G0279 TOMOSYNTHESIS, MAMMO: HCPCS

## 2023-12-27 PROCEDURE — 76642 ULTRASOUND BREAST LIMITED: CPT

## 2023-12-27 PROCEDURE — 77065 DX MAMMO INCL CAD UNI: CPT

## 2024-01-04 ENCOUNTER — HOSPITAL ENCOUNTER (OUTPATIENT)
Dept: MAMMOGRAPHY | Facility: HOSPITAL | Age: 74
Discharge: HOME | End: 2024-01-04
Payer: MEDICARE

## 2024-01-04 ENCOUNTER — PATIENT OUTREACH (OUTPATIENT)
Dept: ONCOLOGY | Facility: HOSPITAL | Age: 74
End: 2024-01-04
Payer: MEDICARE

## 2024-01-04 DIAGNOSIS — N63.11 MASS OF UPPER OUTER QUADRANT OF RIGHT BREAST: ICD-10-CM

## 2024-01-04 PROCEDURE — 88305 TISSUE EXAM BY PATHOLOGIST: CPT | Performed by: FAMILY MEDICINE

## 2024-01-04 PROCEDURE — A4648 IMPLANTABLE TISSUE MARKER: HCPCS

## 2024-01-04 PROCEDURE — 25010000002 LIDOCAINE 1 % SOLUTION: Performed by: FAMILY MEDICINE

## 2024-01-04 RX ORDER — LIDOCAINE HYDROCHLORIDE AND EPINEPHRINE 10; 10 MG/ML; UG/ML
10 INJECTION, SOLUTION INFILTRATION; PERINEURAL ONCE
Status: COMPLETED | OUTPATIENT
Start: 2024-01-04 | End: 2024-01-04

## 2024-01-04 RX ORDER — LIDOCAINE HYDROCHLORIDE 10 MG/ML
10 INJECTION, SOLUTION INFILTRATION; PERINEURAL ONCE
Status: COMPLETED | OUTPATIENT
Start: 2024-01-04 | End: 2024-01-04

## 2024-01-04 RX ADMIN — LIDOCAINE HYDROCHLORIDE,EPINEPHRINE BITARTRATE 10 ML: 10; .01 INJECTION, SOLUTION INFILTRATION; PERINEURAL at 13:44

## 2024-01-04 RX ADMIN — LIDOCAINE HYDROCHLORIDE 10 ML: 10 INJECTION, SOLUTION INFILTRATION; PERINEURAL at 13:44

## 2024-01-05 LAB
CYTO UR: NORMAL
LAB AP CASE REPORT: NORMAL
LAB AP CLINICAL INFORMATION: NORMAL
PATH REPORT.FINAL DX SPEC: NORMAL
PATH REPORT.GROSS SPEC: NORMAL

## 2024-01-08 ENCOUNTER — PATIENT OUTREACH (OUTPATIENT)
Dept: ONCOLOGY | Facility: HOSPITAL | Age: 74
End: 2024-01-08
Payer: MEDICARE

## 2024-01-22 ENCOUNTER — OFFICE VISIT (OUTPATIENT)
Dept: SURGERY | Facility: CLINIC | Age: 74
End: 2024-01-22
Payer: MEDICARE

## 2024-01-22 VITALS — RESPIRATION RATE: 17 BRPM | HEIGHT: 63 IN | BODY MASS INDEX: 25.8 KG/M2 | WEIGHT: 145.6 LBS

## 2024-01-22 DIAGNOSIS — R92.8 ABNORMAL MAMMOGRAM: Primary | ICD-10-CM

## 2024-01-22 PROCEDURE — 99203 OFFICE O/P NEW LOW 30 MIN: CPT | Performed by: SURGERY

## 2024-01-22 PROCEDURE — 1159F MED LIST DOCD IN RCRD: CPT | Performed by: SURGERY

## 2024-01-22 PROCEDURE — 1160F RVW MEDS BY RX/DR IN RCRD: CPT | Performed by: SURGERY

## 2024-01-22 NOTE — PROGRESS NOTES
Chief Complaint: Abnormal Breast Imaging    Subjective         History of Present Illness  Linda Mitchell is a 73 y.o. female presents to [facility] to be seen for abnormal mammogram.  Her imaging and pathology are shown below:    ADDENDUM:      Final pathology for ultrasound-guided core biopsy of the right breast is fibrous breast tissue with   usual ductal hyperplasia.  Negative for atypia and malignancy.  As described in the conclusion, the   area biopsied does not appear to correspond to the subtle mammographic asymmetry designated on   recent diagnostic mammogram (12/27/2023).  Therefore, it would be prudent to obtain a short   interval follow-up right breast mammogram in 3-4 months.  At that time, reassessment of the initial   subtle asymmetry of the right breast can be performed.  Given the lack of sonographic and   mammographic correlation, breast MRI may be useful for further characterization.       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  [facility], , MAMMO SCREENING DIGITAL TOMOSYNTHESIS BILATERAL W   CAD, 9/30/2022, 9:36.  [facility], [facility], MAMMO DIAGNOSTIC DIGITAL TOMOSYNTHESIS RIGHT   W CAD, 10/11/2022, 10:42.  [facility], , MAMMO SCREENING DIGITAL TOMOSYNTHESIS   BILATERAL W CAD, 12/12/2023, 8:47.     VIEWS:  DIAGNOSTIC VIEWS WERE OBTAINED UTILIZING 3D TOMOSYNTHESIS AND R2 CAD SOFTWARE     INDICATIONS:  abnormal mammogram      FINDINGS:          The a symmetry with questionable architectural distortion on the prior mammogram is not well   defined on the additional views obtained on the current exam .  There are benign-appearing   calcifications within both breasts.  A targeted ultrasound the right breast was carried out with a   focus to the 9 o'clock position 4-5 cm from the nipple.  In this area there is a subtle hypoechoic   area with shadowing.  This might account for the finding on the mammogram.  Patient does report   remote trauma to the breast.  Finding might relate to scarring.     IMPRESSION:  There is a subtle hypoechoic area at the 9 o'clock position 4-5 cm from the nipple which could   account for the area suggested on the more recent mammogram.  At this point ultrasound-guided core   biopsy of this area suggested.  This finding will be technically challenging as it is subtle.  This   was discussed with the patient.     RECOMMENDATION(S):               ULTRASOUND-GUIDED CORE BIOPSY: RIGHT BREAST       BIRADS:               DIAGNOSTIC CATEGORY 4--SUSPICIOUS       BREAST COMPOSITION:          Heterogeneously dense,which may obscure small masses.     PLEASE NOTE:  A NORMAL MAMMOGRAM DOES NOT EXCLUDE THE POSSIBILITY OF BREAST CANCER.   ANY CLINICALLY SUSPICIOUS PALPABLE LUMP SHOULD BE BIOPSIED.            An MRI was suggested as the area was not able to be localized under mammogram or ultrasound.       Objective     Past Medical History:   Diagnosis Date    Actinic keratosis     Anemia, unspecified     Atherosclerotic heart disease of native coronary artery without angina pectoris     Breast mass x-ray on 12/12/23 showed suspicious area    biopsy results inconclusive    CAD (coronary artery disease) 11/2018    SHE SAYS SHE HAS HAD AN ANGIOGRAM    Chronic kidney disease     Chronic Elevated BUN/Creatinine and Low GFR    Essential (primary) hypertension     GERD (gastroesophageal reflux disease)     Mixed hyperlipidemia      Myocardial infarction [id number] ?    heart cath /no stints    Neoplasm of unspecified behavior of bone, soft tissue, and skin     Other specified disorders of bone density and structure, multiple sites     RLS (restless legs syndrome)        Past Surgical History:   Procedure Laterality Date    BREAST BIOPSY  1/4/24 by ultrasound  guided core biopsy    Fibrous breast tissue w/usual ductal hyperplasia    CARDIAC CATHETERIZATION      COLONOSCOPY  2012    EYE SURGERY  8/24/23    detached retina (Right) followed by cataract surgery (right) on 11/9/23         Current Outpatient Medications:     Acetaminophen (TYLENOL 8 HOUR PO), Take  by mouth. PRN, Disp: , Rfl:     amLODIPine (NORVASC) 5 MG tablet, TAKE 1 TABLET BY MOUTH DAILY, Disp: 90 tablet, Rfl: 1    atorvastatin (LIPITOR) 80 MG tablet, TAKE 1/2 TABLET BY MOUTH DAILY, Disp: 45 tablet, Rfl: 1    clopidogrel (PLAVIX) 75 MG tablet, Take 1 tablet by mouth Daily., Disp: , Rfl:     esomeprazole (nexIUM) 20 MG capsule, Take 1 capsule by mouth., Disp: , Rfl:     famotidine (PEPCID) 10 MG tablet, Take 1 tablet by mouth As Needed for Heartburn., Disp: , Rfl:     ferrous sulfate 325 (65 FE) MG tablet, Take 1 tablet by mouth., Disp: , Rfl:     isosorbide mononitrate (IMDUR) 30 MG 24 hr tablet, , Disp: , Rfl:     losartan-hydrochlorothiazide (HYZAAR) 100-25 MG per tablet, TAKE 1 TABLET BY MOUTH EVERY DAY, Disp: 90 tablet, Rfl: 1    metoprolol tartrate (LOPRESSOR) 25 MG tablet, Take 1 tablet by mouth Daily., Disp: , Rfl:     Multiple Vitamin (multivitamin) capsule, Take 1 capsule by mouth Daily., Disp: , Rfl:     Multiple Vitamins-Minerals (ONE-A-DAY WOMENS 50+ PO), Take  by mouth Daily., Disp: , Rfl:     pantoprazole (PROTONIX) 40 MG EC tablet, TAKE 1 TABLET BY MOUTH DAILY, Disp: 90 tablet, Rfl: 0    Vitamin E 450 MG capsule, Take  by mouth., Disp: , Rfl:     aspirin 81 MG EC tablet, Take 1 tablet by mouth Daily., Disp: , Rfl:     aspirin-acetaminophen-caffeine (EXCEDRIN  "MIGRAINE) 250-250-65 MG per tablet, Take 1 tablet by mouth Every 6 (Six) Hours As Needed for Headache., Disp: , Rfl:     No Known Allergies     Family History   Problem Relation Age of Onset    Coronary artery disease Mother     Heart disease Mother     Coronary artery disease Sister     Heart attack Sister         REQUIRED CARDIOVERSION    Heart disease Sister     Hypertension Sister        Social History     Socioeconomic History    Marital status:    Tobacco Use    Smoking status: Never    Smokeless tobacco: Never   Vaping Use    Vaping Use: Never used   Substance and Sexual Activity    Alcohol use: Not Currently    Drug use: Never    Sexual activity: Defer       Vital Signs:   Resp 17   Ht 160 cm (62.99\")   Wt 66 kg (145 lb 9.6 oz)   BMI 25.80 kg/m²    Review of Systems    Physical Exam  Vitals and nursing note reviewed.   Constitutional:       Appearance: Normal appearance.   HENT:      Head: Normocephalic and atraumatic.   Eyes:      Extraocular Movements: Extraocular movements intact.      Pupils: Pupils are equal, round, and reactive to light.   Cardiovascular:      Pulses: Normal pulses.   Pulmonary:      Effort: Pulmonary effort is normal. No accessory muscle usage or respiratory distress.   Chest:   Breasts:     Right: Normal. No inverted nipple, mass, nipple discharge or skin change.      Left: Normal. Mass present. No inverted nipple, nipple discharge or skin change.      Comments: 1 cm mass in upper inner quadrant near chest wall on left breast, ecchymosis of right breast biopsy site on right lower outer quadrant  Abdominal:      General: Abdomen is flat.      Palpations: Abdomen is soft.      Tenderness: There is no abdominal tenderness. There is no guarding.   Musculoskeletal:         General: No swelling, tenderness or deformity.      Cervical back: Neck supple.   Lymphadenopathy:      Upper Body:      Right upper body: No supraclavicular or axillary adenopathy.      Left upper body: No " supraclavicular or axillary adenopathy.   Skin:     General: Skin is warm and dry.   Neurological:      General: No focal deficit present.      Mental Status: She is alert and oriented to person, place, and time.   Psychiatric:         Mood and Affect: Mood normal.         Thought Content: Thought content normal.          Result Review :               Assessment and Plan    Diagnoses and all orders for this visit:    1. Abnormal mammogram (Primary)  -     MRI Breast Bilateral Screening With & Without Contrast; Future        Follow Up   No follow-ups on file.  Patient was given instructions and counseling regarding her condition or for health maintenance advice. Please see specific information pulled into the AVS if appropriate.         This document has been electronically signed by Marcy Davila MD  January 22, 2024 09:48 EST

## 2024-02-01 ENCOUNTER — HOSPITAL ENCOUNTER (OUTPATIENT)
Dept: MRI IMAGING | Facility: HOSPITAL | Age: 74
Discharge: HOME | End: 2024-02-01
Admitting: SURGERY
Payer: MEDICARE

## 2024-02-01 DIAGNOSIS — R92.8 ABNORMAL MAMMOGRAM: ICD-10-CM

## 2024-02-01 LAB
CREAT BLDA-MCNC: 1.5 MG/DL (ref 0.6–1.3)
EGFRCR SERPLBLD CKD-EPI 2021: 36.9 ML/MIN/1.73

## 2024-02-01 PROCEDURE — C8908 MRI W/O FOL W/CONT, BREAST,: HCPCS

## 2024-02-01 PROCEDURE — C8937 CAD BREAST MRI: HCPCS

## 2024-02-01 PROCEDURE — A9577 INJ MULTIHANCE: HCPCS | Performed by: SURGERY

## 2024-02-01 PROCEDURE — 0 GADOBENATE DIMEGLUMINE 529 MG/ML SOLUTION: Performed by: SURGERY

## 2024-02-01 PROCEDURE — 82565 ASSAY OF CREATININE: CPT

## 2024-02-01 RX ADMIN — GADOBENATE DIMEGLUMINE 15 ML: 529 INJECTION, SOLUTION INTRAVENOUS at 12:13

## 2024-02-05 ENCOUNTER — OFFICE VISIT (OUTPATIENT)
Dept: SURGERY | Facility: CLINIC | Age: 74
End: 2024-02-05
Payer: MEDICARE

## 2024-02-05 VITALS — HEIGHT: 63 IN | RESPIRATION RATE: 18 BRPM | WEIGHT: 145 LBS | BODY MASS INDEX: 25.69 KG/M2

## 2024-02-05 DIAGNOSIS — R92.8 ABNORMAL MAMMOGRAM: Primary | ICD-10-CM

## 2024-02-05 PROCEDURE — 99212 OFFICE O/P EST SF 10 MIN: CPT | Performed by: SURGERY

## 2024-02-05 PROCEDURE — 1159F MED LIST DOCD IN RCRD: CPT | Performed by: SURGERY

## 2024-02-05 PROCEDURE — 1160F RVW MEDS BY RX/DR IN RCRD: CPT | Performed by: SURGERY

## 2024-02-05 NOTE — PROGRESS NOTES
Chief Complaint: Follow-up    Subjective         History of Present Illness  Linda Mitchell is a 73 y.o. female presents to [facility] to be seen for abnormal mammogram.  Her imaging and pathology are shown below:    ADDENDUM:      Final pathology for ultrasound-guided core biopsy of the right breast is fibrous breast tissue with   usual ductal hyperplasia.  Negative for atypia and malignancy.  As described in the conclusion, the   area biopsied does not appear to correspond to the subtle mammographic asymmetry designated on   recent diagnostic mammogram (12/27/2023).  Therefore, it would be prudent to obtain a short   interval follow-up right breast mammogram in 3-4 months.  At that time, reassessment of the initial   subtle asymmetry of the right breast can be performed.  Given the lack of sonographic and   mammographic correlation, breast MRI may be useful for further characterization.       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  [facility], , MAMMO SCREENING DIGITAL TOMOSYNTHESIS BILATERAL W   CAD, 9/30/2022, 9:36.  [facility], , MAMMO DIAGNOSTIC DIGITAL TOMOSYNTHESIS RIGHT   W CAD, 10/11/2022, 10:42.  [facility], , MAMMO SCREENING DIGITAL TOMOSYNTHESIS   BILATERAL W CAD, 12/12/2023, 8:47.     VIEWS:  DIAGNOSTIC VIEWS WERE OBTAINED UTILIZING 3D TOMOSYNTHESIS AND R2 CAD SOFTWARE     INDICATIONS:  abnormal mammogram     FINDINGS:           The a symmetry with questionable architectural distortion on the prior mammogram is not well   defined on the additional views obtained on the current exam .  There are benign-appearing   calcifications within both breasts.  A targeted ultrasound the right breast was carried out with a   focus to the 9 o'clock position 4-5 cm from the nipple.  In this area there is a subtle hypoechoic   area with shadowing.  This might account for the finding on the mammogram.  Patient does report   remote trauma to the breast.  Finding might relate to scarring.     IMPRESSION:  There is a subtle hypoechoic area at the 9 o'clock position 4-5 cm from the nipple which could   account for the area suggested on the more recent mammogram.  At this point ultrasound-guided core   biopsy of this area suggested.  This finding will be technically challenging as it is subtle.  This   was discussed with the patient.     RECOMMENDATION(S):               ULTRASOUND-GUIDED CORE BIOPSY: RIGHT BREAST       BIRADS:               DIAGNOSTIC CATEGORY 4--SUSPICIOUS       BREAST COMPOSITION:          Heterogeneously dense,which may obscure small masses.     PLEASE NOTE:  A NORMAL MAMMOGRAM DOES NOT EXCLUDE THE POSSIBILITY OF BREAST CANCER.   ANY CLINICALLY SUSPICIOUS PALPABLE LUMP SHOULD BE BIOPSIED.            An MRI was suggested as the area was not able to be localized under mammogram or ultrasound.       Narrative & Impression   PROCEDURE:  MRI BREAST BILATERAL W WO CONTRAST     COMPARISON: [facility], DIG SCREENING BILAT GABI W 3D MAT, 3/13/2019, 8:48.    Other, , DIGITAL SCREENING NO CAD, 2/02/2018, 8:44.  Other, , DIGITAL SCREENING NO CAD,   2/02/2018, 8:44.  [facility], MAMMO SCREENING DIGITAL TOMOSYNTHESIS BILATERAL W   CAD, 9/30/2022, 9:36.  [facility], MG, MAMMO SCREENING DIGITAL TOMOSYNTHESIS   BILATERAL W CAD, 9/20/2021, 9:13.  [facility], US GUIDED BREAST BIOPSY W  WO   DEVICE INITIAL, 1/04/2024, 12:44.  [facility], US, US BREAST RIGHT LIMITED,   12/27/2023, 10:22.  [facility], MAMMO DIAGNOSTIC DIGITAL TOMOSYNTHESIS RIGHT W   CAD, 12/27/2023, 10:04.  [facility], MAMMO SCREENING DIGITAL TOMOSYNTHESIS   BILATERAL W CAD, 12/12/2023, 8:47.  [facility], MAMMO POST DEVICE PLACEMENT   RIGHT, 1/04/2024, 14:42.     INDICATIONS:  73-year-old woman status post ultrasound-guided biopsy of the right breast on   1/4/2024, which was benign showing fibrous breast tissue with usual ductal hyperplasia.  This was   biopsied 6 times using a 12 gauge [employer] biopsy device.  The biopsy clip did not correspond to the   subtle one-view mammographic asymmetry on MLO view.  Short-term follow-up right diagnostic   mammogram is recommended in 3-4 months, as well as breast MRI.     CONTRAST:      15ML  Multihance I.V.     TECHNIQUE:    Breast MRI was performed using dynamic intravenous gadolinium infusion, thin sections,   and a dedicated breast coil.  Contrast enhancement analysis was assisted by computer-aided   detection.       AMOUNT OF FIBROGLANDULAR TISSUE:        Heterogeneous fibroglandular tissue     BACKGROUND PARENCHYMAL ENHANCEMENT:       Minimal symmetric     FINDINGS:  Right breast:  At the site of recent biopsy in the outer central right breast, anterior depth, marked by a   HydroMARK biopsy clip there is 2.3 cm focal non mass enhancement.  No other suspicious mass or non mass enhancement is seen in the right breast.     Left breast:    No suspicious mass or non mass enhancement is seen in the left breast.     No axillary or internal mammary adenopathy is identified.     IMPRESSION:  2.3 cm focal non mass enhancement at the site of recent benign right breast biopsy, likely related   to post biopsy changes.  Recommendation was made for short-term follow-up right diagnostic   mammogram 3-4 months from 1/4/2024 biopsy,  according to the addendum.     No MRI signs of malignancy in either breast.     BIRADS:  DIAGNOSTIC CATEGORY 3--PROBABLY BENIGN FINDING.       RECOMMENDATION(S):  SHORT TERM FOLLOW-UP DIAGNOSTIC MAMMOGRAM RIGHT BREAST IN 2-3 MONTHS.                        Objective     Past Medical History:   Diagnosis Date    Actinic keratosis     Anemia, unspecified     Atherosclerotic heart disease of native coronary artery without angina pectoris     Breast mass x-ray on 12/12/23 showed suspicious area    biopsy results inconclusive    CAD (coronary artery disease) 11/2018    SHE SAYS SHE HAS HAD AN ANGIOGRAM    Chronic kidney disease     Chronic Elevated BUN/Creatinine and Low GFR    Essential (primary) hypertension     GERD (gastroesophageal reflux disease)     Mixed hyperlipidemia     Myocardial infarction [id number] ?    heart cath /no stints    Neoplasm of unspecified behavior of bone, soft tissue, and skin     Other specified disorders of bone density and structure, multiple sites     RLS (restless legs syndrome)        Past Surgical History:   Procedure Laterality Date    BREAST BIOPSY  1/4/24 by ultrasound  guided core biopsy    Fibrous breast tissue w/usual ductal hyperplasia    BREAST SURGERY      CARDIAC CATHETERIZATION      COLONOSCOPY  2012    EYE SURGERY  8/24/23    detached retina (Right) followed by cataract surgery (right) on 11/9/23         Current Outpatient Medications:     Acetaminophen (TYLENOL 8 HOUR PO), Take  by mouth. PRN, Disp: , Rfl:     amLODIPine (NORVASC) 5 MG tablet, TAKE 1 TABLET BY MOUTH DAILY, Disp: 90 tablet, Rfl: 1    atorvastatin (LIPITOR) 80 MG tablet, TAKE 1/2 TABLET BY MOUTH DAILY, Disp: 45 tablet, Rfl: 1    clopidogrel (PLAVIX) 75 MG tablet, Take 1 tablet by mouth Daily., Disp: , Rfl:     esomeprazole (nexIUM) 20 MG capsule, Take 1 capsule by mouth., Disp: , Rfl:     famotidine (PEPCID) 10 MG tablet, Take 1 tablet by mouth As Needed for Heartburn., Disp: , Rfl:     ferrous sulfate 325 (65  "FE) MG tablet, Take 1 tablet by mouth., Disp: , Rfl:     isosorbide mononitrate (IMDUR) 30 MG 24 hr tablet, , Disp: , Rfl:     losartan-hydrochlorothiazide (HYZAAR) 100-25 MG per tablet, TAKE 1 TABLET BY MOUTH EVERY DAY, Disp: 90 tablet, Rfl: 1    metoprolol tartrate (LOPRESSOR) 25 MG tablet, Take 1 tablet by mouth Daily., Disp: , Rfl:     Multiple Vitamin (multivitamin) capsule, Take 1 capsule by mouth Daily., Disp: , Rfl:     Multiple Vitamins-Minerals (ONE-A-DAY WOMENS 50+ PO), Take  by mouth Daily., Disp: , Rfl:     pantoprazole (PROTONIX) 40 MG EC tablet, TAKE 1 TABLET BY MOUTH DAILY, Disp: 90 tablet, Rfl: 0    Vitamin E 450 MG capsule, Take  by mouth., Disp: , Rfl:     No Known Allergies     Family History   Problem Relation Age of Onset    Coronary artery disease Mother     Heart disease Mother     Coronary artery disease Sister     Heart attack Sister         REQUIRED CARDIOVERSION    Heart disease Sister     Hypertension Sister        Social History     Socioeconomic History    Marital status:    Tobacco Use    Smoking status: Never    Smokeless tobacco: Never   Vaping Use    Vaping Use: Never used   Substance and Sexual Activity    Alcohol use: Not Currently    Drug use: Never    Sexual activity: Defer       Vital Signs:   Resp 18   Ht 160 cm (62.99\")   Wt 65.8 kg (145 lb)   BMI 25.69 kg/m²    Review of Systems    Physical Exam  Vitals and nursing note reviewed.   Constitutional:       Appearance: Normal appearance.   HENT:      Head: Normocephalic and atraumatic.   Eyes:      Extraocular Movements: Extraocular movements intact.      Pupils: Pupils are equal, round, and reactive to light.   Cardiovascular:      Pulses: Normal pulses.   Pulmonary:      Effort: Pulmonary effort is normal. No accessory muscle usage or respiratory distress.   Chest:   Breasts:     Right: Normal. No inverted nipple, mass, nipple discharge or skin change.      Left: Normal. Mass present. No inverted nipple, nipple " discharge or skin change.      Comments: 1 cm mass in upper inner quadrant near chest wall on left breast, ecchymosis of right breast biopsy site on right lower outer quadrant  Abdominal:      General: Abdomen is flat.      Palpations: Abdomen is soft.      Tenderness: There is no abdominal tenderness. There is no guarding.   Musculoskeletal:         General: No swelling, tenderness or deformity.      Cervical back: Neck supple.   Lymphadenopathy:      Upper Body:      Right upper body: No supraclavicular or axillary adenopathy.      Left upper body: No supraclavicular or axillary adenopathy.   Skin:     General: Skin is warm and dry.   Neurological:      General: No focal deficit present.      Mental Status: She is alert and oriented to person, place, and time.   Psychiatric:         Mood and Affect: Mood normal.         Thought Content: Thought content normal.          Result Review :               Assessment and Plan    Diagnoses and all orders for this visit:    1. Abnormal mammogram (Primary)  -     Mammo Diagnostic Right With CAD; Future    Will repeat diagnostic mammogram 3 months from prior and call patient with results      Follow Up   Return for Followup after imaging study complete.  Patient was given instructions and counseling regarding her condition or for health maintenance advice. Please see specific information pulled into the AVS if appropriate.         This document has been electronically signed by Marcy Davila MD  February 5, 2024 10:35 EST

## 2024-02-19 DIAGNOSIS — R92.8 ABNORMAL MAMMOGRAM: Primary | ICD-10-CM

## 2024-03-04 DIAGNOSIS — D50.9 IRON DEFICIENCY ANEMIA, UNSPECIFIED IRON DEFICIENCY ANEMIA TYPE: ICD-10-CM

## 2024-03-04 DIAGNOSIS — I10 HYPERTENSION, ESSENTIAL: Primary | ICD-10-CM

## 2024-03-04 DIAGNOSIS — E78.2 MIXED HYPERLIPIDEMIA: ICD-10-CM

## 2024-03-04 RX ORDER — AMLODIPINE BESYLATE 5 MG/1
5 TABLET ORAL DAILY
Qty: 90 TABLET | Refills: 1 | OUTPATIENT
Start: 2024-03-04

## 2024-03-04 NOTE — TELEPHONE ENCOUNTER
Patient, has appt with you on 3/9/24 states she usually does blood work before. Please advise.    Patient has enough medication to last till appt. Kp

## 2024-03-08 ENCOUNTER — LAB (OUTPATIENT)
Dept: LAB | Facility: HOSPITAL | Age: 74
End: 2024-03-08
Payer: MEDICARE

## 2024-03-08 ENCOUNTER — OFFICE VISIT (OUTPATIENT)
Dept: FAMILY MEDICINE CLINIC | Age: 74
End: 2024-03-08
Payer: MEDICARE

## 2024-03-08 VITALS
WEIGHT: 145.8 LBS | SYSTOLIC BLOOD PRESSURE: 104 MMHG | TEMPERATURE: 97.9 F | BODY MASS INDEX: 25.83 KG/M2 | DIASTOLIC BLOOD PRESSURE: 49 MMHG | HEIGHT: 63 IN | HEART RATE: 47 BPM

## 2024-03-08 DIAGNOSIS — E78.2 MIXED HYPERLIPIDEMIA: ICD-10-CM

## 2024-03-08 DIAGNOSIS — I25.10 ATHEROSCLEROSIS OF NATIVE CORONARY ARTERY OF NATIVE HEART WITHOUT ANGINA PECTORIS: ICD-10-CM

## 2024-03-08 DIAGNOSIS — I10 HYPERTENSION, ESSENTIAL: ICD-10-CM

## 2024-03-08 DIAGNOSIS — R53.83 FATIGUE, UNSPECIFIED TYPE: ICD-10-CM

## 2024-03-08 DIAGNOSIS — R01.1 MURMUR: ICD-10-CM

## 2024-03-08 DIAGNOSIS — I10 HYPERTENSION, ESSENTIAL: Primary | ICD-10-CM

## 2024-03-08 DIAGNOSIS — N18.31 STAGE 3A CHRONIC KIDNEY DISEASE: ICD-10-CM

## 2024-03-08 DIAGNOSIS — D50.9 IRON DEFICIENCY ANEMIA, UNSPECIFIED IRON DEFICIENCY ANEMIA TYPE: ICD-10-CM

## 2024-03-08 PROBLEM — R00.1 BRADYCARDIA: Status: ACTIVE | Noted: 2024-03-08

## 2024-03-08 LAB
ALBUMIN SERPL-MCNC: 4 G/DL (ref 3.5–5.2)
ALBUMIN/GLOB SERPL: 1.8 G/DL
ALP SERPL-CCNC: 67 U/L (ref 39–117)
ALT SERPL W P-5'-P-CCNC: 11 U/L (ref 1–33)
ANION GAP SERPL CALCULATED.3IONS-SCNC: 10.5 MMOL/L (ref 5–15)
AST SERPL-CCNC: 18 U/L (ref 1–32)
BASOPHILS # BLD AUTO: 0.02 10*3/MM3 (ref 0–0.2)
BASOPHILS NFR BLD AUTO: 0.4 % (ref 0–1.5)
BILIRUB SERPL-MCNC: 0.5 MG/DL (ref 0–1.2)
BUN SERPL-MCNC: 37 MG/DL (ref 8–23)
BUN/CREAT SERPL: 24 (ref 7–25)
CALCIUM SPEC-SCNC: 9 MG/DL (ref 8.6–10.5)
CHLORIDE SERPL-SCNC: 103 MMOL/L (ref 98–107)
CHOLEST SERPL-MCNC: 144 MG/DL (ref 0–200)
CO2 SERPL-SCNC: 28.5 MMOL/L (ref 22–29)
CREAT SERPL-MCNC: 1.54 MG/DL (ref 0.57–1)
DEPRECATED RDW RBC AUTO: 41.4 FL (ref 37–54)
EGFRCR SERPLBLD CKD-EPI 2021: 35.7 ML/MIN/1.73
EOSINOPHIL # BLD AUTO: 0.11 10*3/MM3 (ref 0–0.4)
EOSINOPHIL NFR BLD AUTO: 2.1 % (ref 0.3–6.2)
ERYTHROCYTE [DISTWIDTH] IN BLOOD BY AUTOMATED COUNT: 12.5 % (ref 12.3–15.4)
FERRITIN SERPL-MCNC: 103 NG/ML (ref 13–150)
GLOBULIN UR ELPH-MCNC: 2.2 GM/DL
GLUCOSE SERPL-MCNC: 95 MG/DL (ref 65–99)
HCT VFR BLD AUTO: 31.8 % (ref 34–46.6)
HDLC SERPL-MCNC: 37 MG/DL (ref 40–60)
HGB BLD-MCNC: 10.6 G/DL (ref 12–15.9)
IMM GRANULOCYTES # BLD AUTO: 0.01 10*3/MM3 (ref 0–0.05)
IMM GRANULOCYTES NFR BLD AUTO: 0.2 % (ref 0–0.5)
IRON 24H UR-MRATE: 60 MCG/DL (ref 37–145)
IRON SATN MFR SERPL: 19 % (ref 20–50)
LDLC SERPL CALC-MCNC: 66 MG/DL (ref 0–100)
LDLC/HDLC SERPL: 1.51 {RATIO}
LYMPHOCYTES # BLD AUTO: 1.39 10*3/MM3 (ref 0.7–3.1)
LYMPHOCYTES NFR BLD AUTO: 26.9 % (ref 19.6–45.3)
MCH RBC QN AUTO: 30 PG (ref 26.6–33)
MCHC RBC AUTO-ENTMCNC: 33.3 G/DL (ref 31.5–35.7)
MCV RBC AUTO: 90.1 FL (ref 79–97)
MONOCYTES # BLD AUTO: 0.47 10*3/MM3 (ref 0.1–0.9)
MONOCYTES NFR BLD AUTO: 9.1 % (ref 5–12)
NEUTROPHILS NFR BLD AUTO: 3.16 10*3/MM3 (ref 1.7–7)
NEUTROPHILS NFR BLD AUTO: 61.3 % (ref 42.7–76)
PLATELET # BLD AUTO: 168 10*3/MM3 (ref 140–450)
PMV BLD AUTO: 10.4 FL (ref 6–12)
POTASSIUM SERPL-SCNC: 3.9 MMOL/L (ref 3.5–5.2)
PROT SERPL-MCNC: 6.2 G/DL (ref 6–8.5)
RBC # BLD AUTO: 3.53 10*6/MM3 (ref 3.77–5.28)
RETICS # AUTO: 0.05 10*6/MM3 (ref 0.02–0.13)
RETICS/RBC NFR AUTO: 1.51 % (ref 0.7–1.9)
SODIUM SERPL-SCNC: 142 MMOL/L (ref 136–145)
TIBC SERPL-MCNC: 320 MCG/DL (ref 298–536)
TRANSFERRIN SERPL-MCNC: 215 MG/DL (ref 200–360)
TRIGL SERPL-MCNC: 255 MG/DL (ref 0–150)
VLDLC SERPL-MCNC: 41 MG/DL (ref 5–40)
WBC NRBC COR # BLD AUTO: 5.16 10*3/MM3 (ref 3.4–10.8)

## 2024-03-08 PROCEDURE — 84466 ASSAY OF TRANSFERRIN: CPT

## 2024-03-08 PROCEDURE — 85025 COMPLETE CBC W/AUTO DIFF WBC: CPT

## 2024-03-08 PROCEDURE — 82728 ASSAY OF FERRITIN: CPT

## 2024-03-08 PROCEDURE — 85045 AUTOMATED RETICULOCYTE COUNT: CPT

## 2024-03-08 PROCEDURE — 83540 ASSAY OF IRON: CPT

## 2024-03-08 PROCEDURE — 80053 COMPREHEN METABOLIC PANEL: CPT

## 2024-03-08 PROCEDURE — 36415 COLL VENOUS BLD VENIPUNCTURE: CPT

## 2024-03-08 PROCEDURE — 80061 LIPID PANEL: CPT

## 2024-03-08 RX ORDER — NAPROXEN SODIUM 220 MG
220 TABLET ORAL 2 TIMES DAILY PRN
COMMUNITY

## 2024-03-08 RX ORDER — CALCIUM CARBONATE 750 MG/1
750 TABLET, CHEWABLE ORAL DAILY PRN
COMMUNITY

## 2024-03-08 RX ORDER — CLOPIDOGREL BISULFATE 75 MG/1
75 TABLET ORAL DAILY
Qty: 90 TABLET | Refills: 1 | Status: SHIPPED | OUTPATIENT
Start: 2024-03-08

## 2024-03-08 NOTE — ASSESSMENT & PLAN NOTE
Blood pressure looks great.  Is actually little on the low side might be playing a role in her fatigue.

## 2024-03-08 NOTE — PROGRESS NOTES
Chief Complaint  Hypertension and Hyperlipidemia    Subjective          Linda Mitchell presents to [facility]  History of Present Illness      Is following with Dr. Davila in regards to her abnormal mammogram.  She has had biopsy that was not clearly revealing.  Was referred for MRI of the breast.  Is to have short term follow-up mammogram upcoming fairly soon.    Was  also diagnosed with retinal detachment and then developed cataracts.  Both have now been corrected and she has 20/20 vision.    Reports her main issue currently is that she is fatiguing more easily than use to.  Sleeps 7-8 hrs with CPAP and does okay with it.  No MALONE  No pedal edema  Does have more issues with back pain and not able to take Aleve due to CKD.  Used to be able to go 8 hrs but now only 5 hrs  States that if she sits down and rests for 15 min she recovers.    She did have her labs drawn earlier this morning and results are still pending    Current Outpatient Medications on File Prior to Visit   Medication Sig Dispense Refill    Acetaminophen (TYLENOL 8 HOUR PO) Take  by mouth. PRN      amLODIPine (NORVASC) 5 MG tablet TAKE 1 TABLET BY MOUTH DAILY 90 tablet 1    atorvastatin (LIPITOR) 80 MG tablet TAKE 1/2 TABLET BY MOUTH DAILY 45 tablet 1    calcium carbonate EX (TUMS EX) 750 MG chewable tablet Chew 1 tablet Daily As Needed for Indigestion or Heartburn.      famotidine (PEPCID) 10 MG tablet Take 4 tablets by mouth As Needed for Heartburn.      ferrous sulfate 325 (65 FE) MG tablet Take 1 tablet by mouth.      isosorbide mononitrate (IMDUR) 30 MG 24 hr tablet       losartan-hydrochlorothiazide (HYZAAR) 100-25 MG per tablet TAKE 1 TABLET BY MOUTH EVERY DAY 90 tablet 1    metoprolol tartrate (LOPRESSOR) 25 MG tablet Take 0.5 tablets by mouth 2 (Two) Times a Day.      naproxen sodium (ALEVE) 220 MG tablet Take 1 tablet by mouth 2 (Two) Times a Day As Needed.      pantoprazole (PROTONIX) 40 MG EC tablet  "TAKE 1 TABLET BY MOUTH DAILY 90 tablet 0    Vitamin E 450 MG capsule Take  by mouth.      [DISCONTINUED] clopidogrel (PLAVIX) 75 MG tablet Take 1 tablet by mouth Daily.      [DISCONTINUED] esomeprazole (nexIUM) 20 MG capsule Take 1 capsule by mouth.      [DISCONTINUED] Multiple Vitamin (multivitamin) capsule Take 1 capsule by mouth Daily.      [DISCONTINUED] Multiple Vitamins-Minerals (ONE-A-DAY WOMENS 50+ PO) Take  by mouth Daily.       No current facility-administered medications on file prior to visit.       Review of Systems         Objective   Vital Signs:   /49 (BP Location: Left arm, Patient Position: Sitting)   Pulse (!) 47   Temp 97.9 °F (36.6 °C) (Temporal)   Ht 160 cm (62.99\")   Wt 66.1 kg (145 lb 12.8 oz)   BMI 25.84 kg/m²     Physical Exam     No acute distress  Slightly pale, but she has light complexion anyway.  Tympanic membranes clear  Oropharynx is clear  No cervical or supraclavicular adenopathy  Thyroid nonenlarged nontender no mass  Heart is bradycardic, does have a 2/6 to 3/6 systolic murmur right upper sternal border  Lungs are bilaterally clear  Lower extremities are without edema  Neurologic without gross lateralizing focal deficit    Result Review :                     Assessment and Plan    Diagnoses and all orders for this visit:    1. Hypertension, essential (Primary)  Assessment & Plan:  Blood pressure looks great.  Is actually little on the low side might be playing a role in her fatigue.        2. Mixed hyperlipidemia  Assessment & Plan:  We will check lab predicate medication change based on results       3. Stage 3a chronic kidney disease  Assessment & Plan:  Repeat lab work today       4. Murmur  Assessment & Plan:  Will get echocardiogram to evaluate    Orders:  -     Adult Transthoracic Echo Complete W/ Cont if Necessary Per Protocol; Future    5. Atherosclerosis of native coronary artery of native heart without angina pectoris  Assessment & Plan:  Does not report " ischemic type symptoms.  Has follow-up with cardiology upcoming.      Orders:  -     clopidogrel (PLAVIX) 75 MG tablet; Take 1 tablet by mouth Daily.  Dispense: 90 tablet; Refill: 1    6. Fatigue, unspecified type  Assessment & Plan:  Many possible causes of her fatigue.  She has been anemic in the past.  Will see what labs look like today.  Is also bradycardic, has heart murmur, blood pressure is on the low side, and has history of coronary artery disease.  Will see what cardiology has to say.          Follow Up   No follow-ups on file.  Patient was given instructions and counseling regarding her condition or for health maintenance advice. Please see specific information pulled into the AVS if appropriate.

## 2024-03-08 NOTE — ASSESSMENT & PLAN NOTE
Many possible causes of her fatigue.  She has been anemic in the past.  Will see what labs look like today.  Is also bradycardic, has heart murmur, blood pressure is on the low side, and has history of coronary artery disease.  Will see what cardiology has to say.

## 2024-03-08 NOTE — ASSESSMENT & PLAN NOTE
He does have bradycardia which has been longstanding.  If no other etiology of fatigue is found might want to consider pacemaker.

## 2024-03-18 RX ORDER — AMLODIPINE BESYLATE 5 MG/1
5 TABLET ORAL DAILY
Qty: 90 TABLET | Refills: 1 | Status: SHIPPED | OUTPATIENT
Start: 2024-03-18

## 2024-05-07 ENCOUNTER — HOSPITAL ENCOUNTER (OUTPATIENT)
Dept: MAMMOGRAPHY | Facility: HOSPITAL | Age: 74
Discharge: HOME | End: 2024-05-07
Admitting: SURGERY
Payer: MEDICARE

## 2024-05-07 DIAGNOSIS — R92.8 ABNORMAL MAMMOGRAM: ICD-10-CM

## 2024-05-07 PROCEDURE — 77065 DX MAMMO INCL CAD UNI: CPT

## 2024-05-07 PROCEDURE — G0279 TOMOSYNTHESIS, MAMMO: HCPCS

## 2024-05-16 DIAGNOSIS — K21.9 GASTROESOPHAGEAL REFLUX DISEASE, UNSPECIFIED WHETHER ESOPHAGITIS PRESENT: ICD-10-CM

## 2024-05-16 RX ORDER — PANTOPRAZOLE SODIUM 40 MG/1
40 TABLET, DELAYED RELEASE ORAL DAILY
Qty: 90 TABLET | Refills: 0 | Status: SHIPPED | OUTPATIENT
Start: 2024-05-16

## 2024-05-21 DIAGNOSIS — K21.9 GASTROESOPHAGEAL REFLUX DISEASE, UNSPECIFIED WHETHER ESOPHAGITIS PRESENT: ICD-10-CM

## 2024-05-21 RX ORDER — PANTOPRAZOLE SODIUM 40 MG/1
40 TABLET, DELAYED RELEASE ORAL DAILY
Qty: 90 TABLET | Refills: 0 | OUTPATIENT
Start: 2024-05-21

## 2024-06-13 RX ORDER — AMLODIPINE BESYLATE 5 MG/1
5 TABLET ORAL DAILY
Qty: 90 TABLET | Refills: 1 | OUTPATIENT
Start: 2024-06-13

## 2024-06-21 NOTE — TELEPHONE ENCOUNTER
Caller: Linda Mitchell CHELLE    Relationship: Self    Best call back number: [phone]     Requested Prescriptions:   Requested Prescriptions     Pending Prescriptions Disp Refills    metoprolol tartrate (LOPRESSOR) 25 MG tablet       Sig: Take 0.5 tablets by mouth 2 (Two) Times a Day.        Pharmacy where request should be sent: [id number] - [facility] [place] - [address] AT [facility] OF [address] [facility] - [phone] [facility] [phone] FX     Last office visit with prescribing clinician: 3/8/2024   Last telemedicine visit with prescribing clinician: Visit date not found   Next office visit with prescribing clinician: 8/26/2024       Does the patient have less than a 3 day supply:  [] Yes  [x] No    Would you like a call back once the refill request has been completed: [] Yes [x] No    If the office needs to give you a call back, can they leave a voicemail: [] Yes [x] No    Vee Presley, PCT   06/21/24 08:46 EDT

## 2024-06-24 DIAGNOSIS — I10 HYPERTENSION, ESSENTIAL: ICD-10-CM

## 2024-06-24 RX ORDER — LOSARTAN POTASSIUM AND HYDROCHLOROTHIAZIDE 25; 100 MG/1; MG/1
TABLET ORAL
Qty: 90 TABLET | Refills: 0 | Status: SHIPPED | OUTPATIENT
Start: 2024-06-24

## 2024-06-24 RX ORDER — ATORVASTATIN CALCIUM 80 MG/1
TABLET, FILM COATED ORAL
Qty: 45 TABLET | Refills: 0 | Status: SHIPPED | OUTPATIENT
Start: 2024-06-24

## 2024-08-08 ENCOUNTER — TELEPHONE (OUTPATIENT)
Dept: FAMILY MEDICINE CLINIC | Age: 74
End: 2024-08-08
Payer: MEDICARE

## 2024-08-08 DIAGNOSIS — K21.9 GASTROESOPHAGEAL REFLUX DISEASE, UNSPECIFIED WHETHER ESOPHAGITIS PRESENT: ICD-10-CM

## 2024-08-08 RX ORDER — PANTOPRAZOLE SODIUM 40 MG/1
40 TABLET, DELAYED RELEASE ORAL DAILY
Qty: 90 TABLET | Refills: 0 | Status: SHIPPED | OUTPATIENT
Start: 2024-08-08

## 2024-08-08 NOTE — TELEPHONE ENCOUNTER
Caller: MILLIE MERCER    Relationship: Emergency Contact    Best call back number: [phone]    Requested Prescriptions:   Requested Prescriptions     Pending Prescriptions Disp Refills    pantoprazole (PROTONIX) 40 MG EC tablet 90 tablet 0     Sig: Take 1 tablet by mouth Daily.        Pharmacy where request should be sent: [facility] #[id number] - [place], [place] - [address] AT [facility] OF [address]  - [phone] [facility] [phone] FX     Last office visit with prescribing clinician: 3/8/2024   Last telemedicine visit with prescribing clinician: Visit date not found   Next office visit with prescribing clinician: 8/26/2024     Additional details provided by patient: PATIENT HAS MORE THAN A THREE DAY SUPPLY OF MEDICATION LEFT. PLEASE SEND NEW PRESCRIPTION TO PHARMACY ASAP.    Does the patient have less than a 3 day supply:  [] Yes  [x] No    Would you like a call back once the refill request has been completed: [] Yes [] No    If the office needs to give you a call back, can they leave a voicemail: [] Yes [] No    Denilson Eden   08/08/24 08:55 EDT

## 2024-08-22 ENCOUNTER — TELEPHONE (OUTPATIENT)
Dept: FAMILY MEDICINE CLINIC | Age: 74
End: 2024-08-22
Payer: MEDICARE

## 2024-08-22 NOTE — TELEPHONE ENCOUNTER
Caller: Linda Mitchell    Relationship: Self    Best call back number: [phone]     What is the best time to reach you: ANY    Who are you requesting to speak with (clinical staff, provider,  specific staff member): CLINICAL    What was the call regarding: PATIENT REQUESTING LAB ORDER FOR BLOOD WORK PRIOR TO SCHEDULED APPOINTMENT ON 08.26.24. PATIENT STATED SHE WOULD LIKE TO GET THE LABS DONE ON THE SAME DAY OF HER APPOINTMENT, IF POSSIBLE.

## 2024-08-26 ENCOUNTER — LAB (OUTPATIENT)
Dept: LAB | Facility: HOSPITAL | Age: 74
End: 2024-08-26
Payer: MEDICARE

## 2024-08-26 ENCOUNTER — OFFICE VISIT (OUTPATIENT)
Dept: FAMILY MEDICINE CLINIC | Age: 74
End: 2024-08-26
Payer: MEDICARE

## 2024-08-26 VITALS
SYSTOLIC BLOOD PRESSURE: 158 MMHG | HEIGHT: 63 IN | DIASTOLIC BLOOD PRESSURE: 71 MMHG | HEART RATE: 51 BPM | WEIGHT: 139.6 LBS | TEMPERATURE: 98.1 F | BODY MASS INDEX: 24.73 KG/M2

## 2024-08-26 DIAGNOSIS — N18.31 STAGE 3A CHRONIC KIDNEY DISEASE: ICD-10-CM

## 2024-08-26 DIAGNOSIS — R01.1 MURMUR: ICD-10-CM

## 2024-08-26 DIAGNOSIS — E78.2 MIXED HYPERLIPIDEMIA: ICD-10-CM

## 2024-08-26 DIAGNOSIS — Z00.00 MEDICARE ANNUAL WELLNESS VISIT, SUBSEQUENT: Primary | ICD-10-CM

## 2024-08-26 DIAGNOSIS — I25.10 ATHEROSCLEROSIS OF NATIVE CORONARY ARTERY OF NATIVE HEART WITHOUT ANGINA PECTORIS: ICD-10-CM

## 2024-08-26 DIAGNOSIS — M85.89 OSTEOPENIA OF MULTIPLE SITES: ICD-10-CM

## 2024-08-26 DIAGNOSIS — Z12.31 ENCOUNTER FOR SCREENING MAMMOGRAM FOR MALIGNANT NEOPLASM OF BREAST: ICD-10-CM

## 2024-08-26 DIAGNOSIS — I10 HYPERTENSION, ESSENTIAL: ICD-10-CM

## 2024-08-26 LAB
BASOPHILS # BLD AUTO: 0.01 10*3/MM3 (ref 0–0.2)
BASOPHILS NFR BLD AUTO: 0.2 % (ref 0–1.5)
DEPRECATED RDW RBC AUTO: 40.6 FL (ref 37–54)
EOSINOPHIL # BLD AUTO: 0.12 10*3/MM3 (ref 0–0.4)
EOSINOPHIL NFR BLD AUTO: 2.4 % (ref 0.3–6.2)
ERYTHROCYTE [DISTWIDTH] IN BLOOD BY AUTOMATED COUNT: 12.5 % (ref 12.3–15.4)
HCT VFR BLD AUTO: 38.1 % (ref 34–46.6)
HGB BLD-MCNC: 12.5 G/DL (ref 12–15.9)
IMM GRANULOCYTES # BLD AUTO: 0.13 10*3/MM3 (ref 0–0.05)
IMM GRANULOCYTES NFR BLD AUTO: 2.6 % (ref 0–0.5)
LYMPHOCYTES # BLD AUTO: 1.2 10*3/MM3 (ref 0.7–3.1)
LYMPHOCYTES NFR BLD AUTO: 23.6 % (ref 19.6–45.3)
MCH RBC QN AUTO: 29.2 PG (ref 26.6–33)
MCHC RBC AUTO-ENTMCNC: 32.8 G/DL (ref 31.5–35.7)
MCV RBC AUTO: 89 FL (ref 79–97)
MONOCYTES # BLD AUTO: 0.48 10*3/MM3 (ref 0.1–0.9)
MONOCYTES NFR BLD AUTO: 9.4 % (ref 5–12)
NEUTROPHILS NFR BLD AUTO: 3.14 10*3/MM3 (ref 1.7–7)
NEUTROPHILS NFR BLD AUTO: 61.8 % (ref 42.7–76)
PLATELET # BLD AUTO: 224 10*3/MM3 (ref 140–450)
PMV BLD AUTO: 10 FL (ref 6–12)
RBC # BLD AUTO: 4.28 10*6/MM3 (ref 3.77–5.28)
WBC NRBC COR # BLD AUTO: 5.08 10*3/MM3 (ref 3.4–10.8)

## 2024-08-26 PROCEDURE — 1170F FXNL STATUS ASSESSED: CPT | Performed by: FAMILY MEDICINE

## 2024-08-26 PROCEDURE — G0439 PPPS, SUBSEQ VISIT: HCPCS | Performed by: FAMILY MEDICINE

## 2024-08-26 PROCEDURE — 3077F SYST BP >= 140 MM HG: CPT | Performed by: FAMILY MEDICINE

## 2024-08-26 PROCEDURE — 36415 COLL VENOUS BLD VENIPUNCTURE: CPT | Performed by: FAMILY MEDICINE

## 2024-08-26 PROCEDURE — 85025 COMPLETE CBC W/AUTO DIFF WBC: CPT | Performed by: FAMILY MEDICINE

## 2024-08-26 PROCEDURE — 80053 COMPREHEN METABOLIC PANEL: CPT | Performed by: FAMILY MEDICINE

## 2024-08-26 PROCEDURE — 80061 LIPID PANEL: CPT | Performed by: FAMILY MEDICINE

## 2024-08-26 PROCEDURE — 99214 OFFICE O/P EST MOD 30 MIN: CPT | Performed by: FAMILY MEDICINE

## 2024-08-26 PROCEDURE — 3078F DIAST BP <80 MM HG: CPT | Performed by: FAMILY MEDICINE

## 2024-08-26 RX ORDER — GLUCOSAM/CHONDRO/HERB 149/HYAL 750-100 MG
TABLET ORAL
COMMUNITY

## 2024-08-26 NOTE — ASSESSMENT & PLAN NOTE
Even though her blood pressure is elevated today her pressures at home have been excellent.  She will continue to monitor blood pressures will make no changes in her medicine at this time.  Will get labs today see how her kidney functions doing off of the losartan/HCTZ.

## 2024-08-26 NOTE — PROGRESS NOTES
"Chief Complaint  Medicare Wellness-subsequent, Hypertension, and Hyperlipidemia    Subjective        Linda Mitchell presents to [facility]  History of Present Illness  Ms. Mitchell presents for her wellness check. She states that she has no complaints today.     During her appointment in  February, she reported excessive tiredness and lack of energy. She then has several episodes of light headedness and seeing stars in May. She checked her BP during the episodes and they were low at around 70/45. She stopped taking Losartan and followed up with her cardiologist who changed her regimen to two medications in the morning and two medications at night.     Objective   Vital Signs:  /71 (BP Location: Left arm, Patient Position: Sitting)   Pulse 51   Temp 98.1 °F (36.7 °C) (Temporal)   Ht 160 cm (62.99\")   Wt 63.3 kg (139 lb 9.6 oz)   BMI 24.74 kg/m²   Estimated body mass index is 24.74 kg/m² as calculated from the following:    Height as of this encounter: 160 cm (62.99\").    Weight as of this encounter: 63.3 kg (139 lb 9.6 oz).    BMI is within normal parameters. No other follow-up for BMI required.      Physical Exam  Constitutional:       Appearance: Normal appearance. She is normal weight.   Neurological:      General: No focal deficit present.      Mental Status: She is alert and oriented to person, place, and time.   Psychiatric:         Mood and Affect: Mood normal.      Result Review :                Assessment and Plan   Diagnoses and all orders for this visit:    1. Medicare annual wellness visit, subsequent (Primary)    2. Hypertension, essential  Assessment & Plan:        3. Mixed hyperlipidemia  Assessment & Plan:         4. Atherosclerosis of native coronary artery of native heart without angina pectoris  Assessment & Plan:        5. Stage 3a chronic kidney disease  Assessment & Plan:        6. Osteopenia of multiple sites             Follow Up   No follow-ups on " file.  Patient was given instructions and counseling regarding her condition or for health maintenance advice. Please see specific information pulled into the AVS if appropriate.

## 2024-08-26 NOTE — ASSESSMENT & PLAN NOTE
She was unable to complete the previous echocardiogram for various reasons.  Anyway she is interested in pursuing that now.  Will see if we get that scheduled.

## 2024-08-26 NOTE — PROGRESS NOTES
Subjective   The ABCs of the Annual Wellness Visit  Medicare Wellness Visit      Linda Mitchell is a 73 y.o. patient who presents for a Medicare Wellness Visit.    The following portions of the patient's history were reviewed and   updated as appropriate: allergies, current medications, past family history, past medical history, past social history, past surgical history, and problem list.    Compared to one year ago, the patient's physical   health is the same.  Compared to one year ago, the patient's mental   health is the same.    Recent Hospitalizations:  She was not admitted to the hospital during the last year.     Current Medical Providers:  Patient Care Team:  Hudson Leon MD as PCP - General (Family Medicine)  Robert Vincent MD (General Surgery)  Nga Mesa RN as Nurse Navigator  Marcy Davila MD as Consulting Physician (General Surgery)    Outpatient Medications Prior to Visit   Medication Sig Dispense Refill    Acetaminophen (TYLENOL 8 HOUR PO) Take  by mouth. PRN      amLODIPine (NORVASC) 5 MG tablet TAKE 1 TABLET BY MOUTH DAILY 90 tablet 1    atorvastatin (LIPITOR) 80 MG tablet TAKE 1/2 TABLET BY MOUTH DAILY 45 tablet 0    calcium carbonate EX (TUMS EX) 750 MG chewable tablet Chew 1 tablet Daily As Needed for Indigestion or Heartburn.      clopidogrel (PLAVIX) 75 MG tablet Take 1 tablet by mouth Daily. 90 tablet 1    famotidine (PEPCID) 10 MG tablet Take 4 tablets by mouth As Needed for Heartburn.      ferrous sulfate 325 (65 FE) MG tablet Take 1 tablet by mouth.      isosorbide mononitrate (IMDUR) 30 MG 24 hr tablet       metoprolol tartrate (LOPRESSOR) 25 MG tablet Take 0.5 tablets by mouth 2 (Two) Times a Day. 90 tablet 1    Misc Natural Products (Glucosamine Chond Cmp Advanced) tablet Take  by mouth.      naproxen sodium (ALEVE) 220 MG tablet Take 1 tablet by mouth 2 (Two) Times a Day As Needed.      pantoprazole (PROTONIX) 40 MG EC tablet Take 1 tablet by mouth Daily.  "90 tablet 0    Vitamin E 450 MG capsule Take  by mouth.      losartan-hydrochlorothiazide (HYZAAR) 100-25 MG per tablet TAKE 1 TABLET BY MOUTH EVERY DAY (Patient not taking: Reported on 8/26/2024) 90 tablet 0     No facility-administered medications prior to visit.     No opioid medication identified on active medication list. I have reviewed chart for other potential  high risk medication/s and harmful drug interactions in the elderly.      Aspirin is not on active medication list.  Aspirin use is not indicated based on review of current medical condition/s. Risk of harm outweighs potential benefits.  .    Patient Active Problem List   Diagnosis    Atherosclerosis of native coronary artery of native heart without angina pectoris    History of non-ST elevation myocardial infarction (NSTEMI)    Hypertension, essential    Mixed hyperlipidemia    Anemia, unspecified    Osteopenia of multiple sites    Encounter for screening mammogram for malignant neoplasm of breast    Colon cancer screening    Medicare annual wellness visit, subsequent    Actinic keratoses    GERD (gastroesophageal reflux disease)    Raynaud's disease without gangrene    Stage 3a chronic kidney disease    Sleep disturbance    Murmur    Sleep apnea    Fatigue    vaccination    Vision changes    Abnormal mammogram    Bradycardia     Advance Care Planning Advance Directive is not on file.  ACP discussion was held with the patient during this visit. Patient does not have an advance directive, information provided.            Objective   Vitals:    08/26/24 0858   BP: 158/71   BP Location: Left arm   Patient Position: Sitting   Pulse: 51   Temp: 98.1 °F (36.7 °C)   TempSrc: Temporal   Weight: 63.3 kg (139 lb 9.6 oz)   Height: 160 cm (62.99\")       Estimated body mass index is 24.74 kg/m² as calculated from the following:    Height as of this encounter: 160 cm (62.99\").    Weight as of this encounter: 63.3 kg (139 lb 9.6 oz).    BMI is >= 25 and <30. " (Overweight) The following options were offered after discussion;: none (medical contraindication)       Does the patient have evidence of cognitive impairment? No                                                                                                Health  Risk Assessment    Smoking Status:  Social History     Tobacco Use   Smoking Status Never   Smokeless Tobacco Never     Alcohol Consumption:  Social History     Substance and Sexual Activity   Alcohol Use Not Currently       Fall Risk Screen  STEADI Fall Risk Assessment was completed, and patient is at LOW risk for falls.Assessment completed on:2024    Depression Screenin/26/2024     9:03 AM   PHQ-2/PHQ-9 Depression Screening   Little Interest or Pleasure in Doing Things 0-->not at all   Feeling Down, Depressed or Hopeless 0-->not at all   PHQ-9: Brief Depression Severity Measure Score 0     Health Habits and Functional and Cognitive Screenin/26/2024     9:03 AM   Functional & Cognitive Status   Do you have difficulty preparing food and eating? No   Do you have difficulty bathing yourself, getting dressed or grooming yourself? No   Do you have difficulty using the toilet? No   Do you have difficulty moving around from place to place? No   Do you have trouble with steps or getting out of a bed or a chair? No   Current Diet Well Balanced Diet   Dental Exam Not up to date   Eye Exam Up to date   Exercise (times per week) 5 times per week   Current Exercises Include Yard Work;Home Exercise Program (TV, Computer, Etc.)   Do you need help using the phone?  No   Are you deaf or do you have serious difficulty hearing?  No   Do you need help to go to places out of walking distance? No   Do you need help shopping? No   Do you need help preparing meals?  No   Do you need help with housework?  No   Do you need help with laundry? No   Do you need help taking your medications? No   Do you need help managing money? No   Do you ever drive or ride  in a car without wearing a seat belt? No   Have you felt unusual stress, anger or loneliness in the last month? No   Who do you live with? Spouse   If you need help, do you have trouble finding someone available to you? No   Have you been bothered in the last four weeks by sexual problems? No   Do you have difficulty concentrating, remembering or making decisions? No           Age-appropriate Screening Schedule:  Refer to the list below for future screening recommendations based on patient's age, sex and/or medical conditions. Orders for these recommended tests are listed in the plan section. The patient has been provided with a written plan.    Health Maintenance List  Health Maintenance   Topic Date Due    TDAP/TD VACCINES (1 - Tdap) Never done    ZOSTER VACCINE (1 of 2) Never done    COVID-19 Vaccine (5 - 2023-24 season) 09/01/2023    INFLUENZA VACCINE  08/01/2024    LIPID PANEL  03/08/2025    ANNUAL WELLNESS VISIT  08/26/2025    DXA SCAN  12/12/2025    MAMMOGRAM  05/07/2026    COLORECTAL CANCER SCREENING  04/26/2032    HEPATITIS C SCREENING  Completed    Pneumococcal Vaccine 65+  Completed                                                                                                                                                CMS Preventative Services Quick Reference  Risk Factors Identified During Encounter  None Identified    The above risks/problems have been discussed with the patient.  Pertinent information has been shared with the patient in the After Visit Summary.  An After Visit Summary and PPPS were made available to the patient.    Follow Up:   Next Medicare Wellness visit to be scheduled in 1 year.         Additional E&M Note during same encounter follows:  Patient has additional, significant, and separately identifiable condition(s)/problem(s) that require work above and beyond the Medicare Wellness Visit     Chief Complaint  Medicare Wellness-subsequent, Hypertension, and  "Hyperlipidemia    Subjective   HPI  Linda is also being seen today for additional medical problem/s.    Please see the student note from today.  Patient history, exam, and assessment independently verified.      Has diagnoses of hypertension and hyperlipidemia.  Blood pressure elevated today primarily she says because she is frustrated.  She brings in a log of blood pressures that look quite good.  Had weaned herself off of the losartan/HCTZ.  Since then she says she has had less episodes of feeling fatigued and lightheaded.  She did see the cardiologist shortly after she weaned off.  States cardiologist told her to split her medications up to in the morning 2 at night.  Apparently she started taking the Imdur at nighttime which I told her was probably not the best choice would be better to move that to the morning.    Has known coronary artery disease.  She does do some regular exercise for 5 days a week.      Underwent breast biopsy earlier this year and followed by by Dr. Davila.  Had a follow-up breast MRI in May 7 that was stable.  She will be due for her routine mammogram in December of this year.              Objective   Vital Signs:  /71 (BP Location: Left arm, Patient Position: Sitting)   Pulse 51   Temp 98.1 °F (36.7 °C) (Temporal)   Ht 160 cm (62.99\")   Wt 63.3 kg (139 lb 9.6 oz)   BMI 24.74 kg/m²   Physical Exam  Constitutional:       Appearance: Normal appearance.   HENT:      Right Ear: Tympanic membrane normal.      Left Ear: Tympanic membrane normal.      Mouth/Throat:      Pharynx: No oropharyngeal exudate or posterior oropharyngeal erythema.      Comments: Small oropharyngeal airway  Eyes:      General: No scleral icterus.  Neck:      Vascular: No carotid bruit.   Cardiovascular:      Rate and Rhythm: Normal rate and regular rhythm.      Heart sounds: Normal heart sounds. No murmur heard.  Pulmonary:      Effort: No respiratory distress.      Breath sounds: No wheezing or rales. "   Musculoskeletal:      Right lower leg: No edema.      Left lower leg: No edema.   Lymphadenopathy:      Cervical: No cervical adenopathy.   Neurological:      General: No focal deficit present.      Mental Status: She is alert.   Psychiatric:         Attention and Perception: Attention normal.         Mood and Affect: Mood normal.         Speech: Speech normal.                 Assessment and Plan               Medicare annual wellness visit, subsequent  We reviewed the preventive service recommendations and created an individualized handout  Hypertension, essential  Even though her blood pressure is elevated today her pressures at home have been excellent.  She will continue to monitor blood pressures will make no changes in her medicine at this time.  Will get labs today see how her kidney functions doing off of the losartan/HCTZ.  Mixed hyperlipidemia  Check lab predicate change based on results  Atherosclerosis of native coronary artery of native heart without angina pectoris  I did recommend she switch the Imdur to the morning.  Stage 3a chronic kidney disease  Repeat labs today will see if renal function improves off of the losartan/HCTZ  Osteopenia of multiple sites  Up-to-date on DEXA will need to repeat next year  Encounter for screening mammogram for malignant neoplasm of breast  Due for screening mammogram in December we will go ahead and get that scheduled  Murmur  She was unable to complete the previous echocardiogram for various reasons.  Anyway she is interested in pursuing that now.  Will see if we get that scheduled.    Orders Placed This Encounter   Procedures    Mammo Screening Digital Tomosynthesis Bilateral With CAD     In December     Standing Status:   Future     Standing Expiration Date:   8/26/2025     Scheduling Instructions:      In December     Order Specific Question:   Reason for Exam:     Answer:   screen     Order Specific Question:   Does this patient have a diabetic  monitoring/medication delivering device on?     Answer:   No     Order Specific Question:   Release to patient     Answer:   Routine Release [[id number]]    Comprehensive Metabolic Panel     Order Specific Question:   Release to patient     Answer:   Routine Release [[id number]]    Lipid Panel     Order Specific Question:   Release to patient     Answer:   Routine Release [[id number]]    Adult Transthoracic Echo Complete W/ Cont if Necessary Per Protocol     Standing Status:   Future     Standing Expiration Date:   8/26/2025     Order Specific Question:   Reason for exam?     Answer:   Murmur or Click     Order Specific Question:   Release to patient     Answer:   Routine Release [[id number]]    CBC & Differential     Order Specific Question:   Manual Differential     Answer:   No     Order Specific Question:   Release to patient     Answer:   Routine Release [[id number]]             Follow Up   No follow-ups on file.  Patient was given instructions and counseling regarding her condition or for health maintenance advice. Please see specific information pulled into the AVS if appropriate.

## 2024-08-27 LAB
ALBUMIN SERPL-MCNC: 4.3 G/DL (ref 3.5–5.2)
ALBUMIN/GLOB SERPL: 1.7 G/DL
ALP SERPL-CCNC: 98 U/L (ref 39–117)
ALT SERPL W P-5'-P-CCNC: 13 U/L (ref 1–33)
ANION GAP SERPL CALCULATED.3IONS-SCNC: 8 MMOL/L (ref 5–15)
AST SERPL-CCNC: 18 U/L (ref 1–32)
BILIRUB SERPL-MCNC: 0.3 MG/DL (ref 0–1.2)
BUN SERPL-MCNC: 23 MG/DL (ref 8–23)
BUN/CREAT SERPL: 20.9 (ref 7–25)
CALCIUM SPEC-SCNC: 9.9 MG/DL (ref 8.6–10.5)
CHLORIDE SERPL-SCNC: 104 MMOL/L (ref 98–107)
CHOLEST SERPL-MCNC: 157 MG/DL (ref 0–200)
CO2 SERPL-SCNC: 26 MMOL/L (ref 22–29)
CREAT SERPL-MCNC: 1.1 MG/DL (ref 0.57–1)
EGFRCR SERPLBLD CKD-EPI 2021: 53.5 ML/MIN/1.73
GLOBULIN UR ELPH-MCNC: 2.6 GM/DL
GLUCOSE SERPL-MCNC: 92 MG/DL (ref 65–99)
HDLC SERPL-MCNC: 35 MG/DL (ref 40–60)
LDLC SERPL CALC-MCNC: 82 MG/DL (ref 0–100)
LDLC/HDLC SERPL: 2.13 {RATIO}
POTASSIUM SERPL-SCNC: 5.1 MMOL/L (ref 3.5–5.2)
PROT SERPL-MCNC: 6.9 G/DL (ref 6–8.5)
SODIUM SERPL-SCNC: 138 MMOL/L (ref 136–145)
TRIGL SERPL-MCNC: 237 MG/DL (ref 0–150)
VLDLC SERPL-MCNC: 40 MG/DL (ref 5–40)

## 2024-09-03 RX ORDER — AMLODIPINE BESYLATE 5 MG/1
5 TABLET ORAL DAILY
Qty: 90 TABLET | Refills: 1 | OUTPATIENT
Start: 2024-09-03

## 2024-09-03 RX ORDER — AMLODIPINE BESYLATE 5 MG/1
5 TABLET ORAL DAILY
Qty: 90 TABLET | Refills: 1 | Status: SHIPPED | OUTPATIENT
Start: 2024-09-03

## 2024-09-03 NOTE — TELEPHONE ENCOUNTER
Caller: MILLIE MERCER    Relationship: Emergency Contact    Best call back number: [phone]    Requested Prescriptions:   Requested Prescriptions     Pending Prescriptions Disp Refills    amLODIPine (NORVASC) 5 MG tablet 90 tablet 1     Sig: Take 1 tablet by mouth Daily.        Pharmacy where request should be sent: [id number] - [place], [place] - [address] AT [facility] [address]  - [phone]  - [phone] FX     Last office visit with prescribing clinician: 8/26/2024   Last telemedicine visit with prescribing clinician: Visit date not found   Next office visit with prescribing clinician: 2/10/2025     Additional details provided by patient:           Does the patient have less than a 3 day supply:  [] Yes  [x] No    Would you like a call back once the refill request has been completed: [] Yes [x] No    If the office needs to give you a call back, can they leave a voicemail: [] Yes [x] No    Denilson Delatorre Rep   09/03/24 15:48 EDT

## 2024-09-16 DIAGNOSIS — I25.10 ATHEROSCLEROSIS OF NATIVE CORONARY ARTERY OF NATIVE HEART WITHOUT ANGINA PECTORIS: ICD-10-CM

## 2024-09-17 RX ORDER — CLOPIDOGREL BISULFATE 75 MG/1
75 TABLET ORAL DAILY
Qty: 90 TABLET | Refills: 1 | Status: SHIPPED | OUTPATIENT
Start: 2024-09-17

## 2024-09-23 RX ORDER — ATORVASTATIN CALCIUM 80 MG/1
TABLET, FILM COATED ORAL
Qty: 45 TABLET | Refills: 1 | Status: SHIPPED | OUTPATIENT
Start: 2024-09-23

## 2024-10-28 DIAGNOSIS — K21.9 GASTROESOPHAGEAL REFLUX DISEASE, UNSPECIFIED WHETHER ESOPHAGITIS PRESENT: ICD-10-CM

## 2024-10-28 RX ORDER — PANTOPRAZOLE SODIUM 40 MG/1
40 TABLET, DELAYED RELEASE ORAL DAILY
Qty: 90 TABLET | Refills: 1 | Status: SHIPPED | OUTPATIENT
Start: 2024-10-28

## 2024-10-29 RX ORDER — AMLODIPINE BESYLATE 5 MG/1
5 TABLET ORAL DAILY
Qty: 90 TABLET | Refills: 1 | Status: SHIPPED | OUTPATIENT
Start: 2024-10-29

## 2024-10-29 NOTE — TELEPHONE ENCOUNTER
Caller: MILLIE MERCER    Relationship: Emergency Contact    Best call back number:     [phone]        Requested Prescriptions:   Requested Prescriptions     Pending Prescriptions Disp Refills    amLODIPine (NORVASC) 5 MG tablet 90 tablet 1     Sig: Take 1 tablet by mouth Daily.        Pharmacy where request should be sent: [facility] #[id number] - [facility] [address] AT [facility] [address]  - [phone]  - [phone] FX     Last office visit with prescribing clinician: 8/26/2024   Last telemedicine visit with prescribing clinician: Visit date not found   Next office visit with prescribing clinician: 2/10/2025     Additional details provided by patient: CALLER STATES THAT PATIENT DID NOT  MEDICATION SENT 9.3.2024. PLEASE RESUBMIT TO PHARMACY.    Does the patient have less than a 3 day supply:  [] Yes  [x] No    Would you like a call back once the refill request has been completed: [] Yes [x] No    If the office needs to give you a call back, can they leave a voicemail: [] Yes [x] No    Denilson Moreira Rep   10/29/24 08:51 EDT

## 2024-12-13 ENCOUNTER — HOSPITAL ENCOUNTER (OUTPATIENT)
Dept: MAMMOGRAPHY | Facility: HOSPITAL | Age: 74
Discharge: HOME | End: 2024-12-13
Admitting: FAMILY MEDICINE
Payer: MEDICARE

## 2024-12-13 DIAGNOSIS — Z12.31 ENCOUNTER FOR SCREENING MAMMOGRAM FOR MALIGNANT NEOPLASM OF BREAST: ICD-10-CM

## 2024-12-13 PROCEDURE — 77067 SCR MAMMO BI INCL CAD: CPT

## 2024-12-13 PROCEDURE — 77063 BREAST TOMOSYNTHESIS BI: CPT

## 2024-12-13 RX ORDER — METOPROLOL TARTRATE 25 MG/1
12.5 TABLET, FILM COATED ORAL DAILY
Qty: 90 TABLET | Refills: 0 | Status: SHIPPED | OUTPATIENT
Start: 2024-12-13

## 2024-12-26 RX ORDER — METOPROLOL TARTRATE 25 MG/1
12.5 TABLET, FILM COATED ORAL DAILY
Qty: 90 TABLET | Refills: 0 | OUTPATIENT
Start: 2024-12-26

## 2025-02-04 DIAGNOSIS — K21.9 GASTROESOPHAGEAL REFLUX DISEASE, UNSPECIFIED WHETHER ESOPHAGITIS PRESENT: ICD-10-CM

## 2025-02-04 RX ORDER — PANTOPRAZOLE SODIUM 40 MG/1
40 TABLET, DELAYED RELEASE ORAL DAILY
Qty: 90 TABLET | Refills: 1 | OUTPATIENT
Start: 2025-02-04

## 2025-02-10 ENCOUNTER — OFFICE VISIT (OUTPATIENT)
Dept: FAMILY MEDICINE CLINIC | Age: 75
End: 2025-02-10
Payer: MEDICARE

## 2025-02-10 ENCOUNTER — LAB (OUTPATIENT)
Dept: LAB | Facility: HOSPITAL | Age: 75
End: 2025-02-10
Payer: MEDICARE

## 2025-02-10 VITALS
HEART RATE: 42 BPM | TEMPERATURE: 98.2 F | SYSTOLIC BLOOD PRESSURE: 125 MMHG | HEIGHT: 63 IN | OXYGEN SATURATION: 95 % | BODY MASS INDEX: 25.27 KG/M2 | WEIGHT: 142.6 LBS | DIASTOLIC BLOOD PRESSURE: 76 MMHG

## 2025-02-10 DIAGNOSIS — G47.30 SLEEP APNEA, UNSPECIFIED TYPE: ICD-10-CM

## 2025-02-10 DIAGNOSIS — N18.31 STAGE 3A CHRONIC KIDNEY DISEASE: ICD-10-CM

## 2025-02-10 DIAGNOSIS — I25.10 ATHEROSCLEROSIS OF NATIVE CORONARY ARTERY OF NATIVE HEART WITHOUT ANGINA PECTORIS: ICD-10-CM

## 2025-02-10 DIAGNOSIS — E78.2 MIXED HYPERLIPIDEMIA: ICD-10-CM

## 2025-02-10 DIAGNOSIS — I10 HYPERTENSION, ESSENTIAL: Primary | ICD-10-CM

## 2025-02-10 LAB
ALBUMIN SERPL-MCNC: 4.1 G/DL (ref 3.5–5.2)
ALBUMIN UR-MCNC: <1.2 MG/DL
ALBUMIN/GLOB SERPL: 1.6 G/DL
ALP SERPL-CCNC: 86 U/L (ref 39–117)
ALT SERPL W P-5'-P-CCNC: 26 U/L (ref 1–33)
ANION GAP SERPL CALCULATED.3IONS-SCNC: 8.5 MMOL/L (ref 5–15)
AST SERPL-CCNC: 22 U/L (ref 1–32)
BILIRUB SERPL-MCNC: 0.4 MG/DL (ref 0–1.2)
BUN SERPL-MCNC: 24 MG/DL (ref 8–23)
BUN/CREAT SERPL: 20.5 (ref 7–25)
CALCIUM SPEC-SCNC: 9.5 MG/DL (ref 8.6–10.5)
CHLORIDE SERPL-SCNC: 105 MMOL/L (ref 98–107)
CHOLEST SERPL-MCNC: 172 MG/DL (ref 0–200)
CO2 SERPL-SCNC: 27.5 MMOL/L (ref 22–29)
CREAT SERPL-MCNC: 1.17 MG/DL (ref 0.57–1)
CREAT UR-MCNC: 112.5 MG/DL
DEPRECATED RDW RBC AUTO: 43.5 FL (ref 37–54)
EGFRCR SERPLBLD CKD-EPI 2021: 49.4 ML/MIN/1.73
ERYTHROCYTE [DISTWIDTH] IN BLOOD BY AUTOMATED COUNT: 13.2 % (ref 12.3–15.4)
GLOBULIN UR ELPH-MCNC: 2.5 GM/DL
GLUCOSE SERPL-MCNC: 88 MG/DL (ref 65–99)
HCT VFR BLD AUTO: 40.2 % (ref 34–46.6)
HDLC SERPL-MCNC: 46 MG/DL (ref 40–60)
HGB BLD-MCNC: 13.4 G/DL (ref 12–15.9)
LDLC SERPL CALC-MCNC: 100 MG/DL (ref 0–100)
LDLC/HDLC SERPL: 2.1 {RATIO}
MCH RBC QN AUTO: 29.7 PG (ref 26.6–33)
MCHC RBC AUTO-ENTMCNC: 33.3 G/DL (ref 31.5–35.7)
MCV RBC AUTO: 89.1 FL (ref 79–97)
MICROALBUMIN/CREAT UR: NORMAL MG/G{CREAT}
PLATELET # BLD AUTO: 188 10*3/MM3 (ref 140–450)
PMV BLD AUTO: 10.2 FL (ref 6–12)
POTASSIUM SERPL-SCNC: 4.7 MMOL/L (ref 3.5–5.2)
PROT SERPL-MCNC: 6.6 G/DL (ref 6–8.5)
RBC # BLD AUTO: 4.51 10*6/MM3 (ref 3.77–5.28)
SODIUM SERPL-SCNC: 141 MMOL/L (ref 136–145)
TRIGL SERPL-MCNC: 146 MG/DL (ref 0–150)
VLDLC SERPL-MCNC: 26 MG/DL (ref 5–40)
WBC NRBC COR # BLD AUTO: 4.99 10*3/MM3 (ref 3.4–10.8)

## 2025-02-10 PROCEDURE — 85027 COMPLETE CBC AUTOMATED: CPT | Performed by: FAMILY MEDICINE

## 2025-02-10 PROCEDURE — G2211 COMPLEX E/M VISIT ADD ON: HCPCS | Performed by: FAMILY MEDICINE

## 2025-02-10 PROCEDURE — 82570 ASSAY OF URINE CREATININE: CPT | Performed by: FAMILY MEDICINE

## 2025-02-10 PROCEDURE — 80053 COMPREHEN METABOLIC PANEL: CPT | Performed by: FAMILY MEDICINE

## 2025-02-10 PROCEDURE — 99214 OFFICE O/P EST MOD 30 MIN: CPT | Performed by: FAMILY MEDICINE

## 2025-02-10 PROCEDURE — 80061 LIPID PANEL: CPT | Performed by: FAMILY MEDICINE

## 2025-02-10 PROCEDURE — 3078F DIAST BP <80 MM HG: CPT | Performed by: FAMILY MEDICINE

## 2025-02-10 PROCEDURE — 3074F SYST BP LT 130 MM HG: CPT | Performed by: FAMILY MEDICINE

## 2025-02-10 PROCEDURE — 82043 UR ALBUMIN QUANTITATIVE: CPT | Performed by: FAMILY MEDICINE

## 2025-02-10 PROCEDURE — 36415 COLL VENOUS BLD VENIPUNCTURE: CPT | Performed by: FAMILY MEDICINE

## 2025-02-10 NOTE — PROGRESS NOTES
Chief Complaint  Hyperlipidemia and Hypertension    Subjective          Linda Mitchell presents to [facility]  History of Present Illness  States she is doing great  Says she has a lot more energy now that she has made the adjustment in her medication.    She does have history of coronary artery disease.  No current symptoms consistent with ischemia.  She says that she tried moving the Imdur to the morning but did not work very well causing low energy fatigue.  After 2-week trial she went back to taking it at nighttime.  She does have appointment with cardiology June 13.  I encouraged her to discuss with her cardiologist.    [place] stays [place] farm.  Inconsistently she says she tries to do some exercises sometimes up to 45 minutes.    She has follow-up appointment with sleep specialist this coming Thursday.        Current Outpatient Medications on File Prior to Visit   Medication Sig Dispense Refill    Acetaminophen (TYLENOL 8 HOUR PO) Take  by mouth. PRN      amLODIPine (NORVASC) 5 MG tablet Take 1 tablet by mouth Daily. 90 tablet 1    atorvastatin (LIPITOR) 80 MG tablet TAKE 1/2 TABLET BY MOUTH DAILY 45 tablet 1    calcium carbonate EX (TUMS EX) 750 MG chewable tablet Chew 1 tablet Daily As Needed for Indigestion or Heartburn.      clopidogrel (PLAVIX) 75 MG tablet Take 1 tablet by mouth Daily. 90 tablet 1    famotidine (PEPCID) 10 MG tablet Take 4 tablets by mouth As Needed for Heartburn.      ferrous sulfate 325 (65 FE) MG tablet Take 1 tablet by mouth.      isosorbide mononitrate (IMDUR) 30 MG 24 hr tablet       metoprolol tartrate (LOPRESSOR) 25 MG tablet TAKE 1/2 TABLET BY MOUTH TWICE DAILY 90 tablet 0    Misc Natural Products (Glucosamine Chond Cmp Advanced) tablet Take  by mouth.      naproxen sodium (ALEVE) 220 MG tablet Take 1 tablet by mouth 2 (Two) Times a Day As Needed.      pantoprazole (PROTONIX) 40 MG EC tablet TAKE 1 TABLET BY MOUTH DAILY 90 tablet 1  "   Vitamin E 450 MG capsule Take  by mouth.       No current facility-administered medications on file prior to visit.       Review of Systems         Objective   Vital Signs:   /76 (BP Location: Left arm, Patient Position: Sitting)   Pulse (!) 42   Temp 98.2 °F (36.8 °C) (Oral)   Ht 160 cm (62.99\")   Wt 64.7 kg (142 lb 9.6 oz)   SpO2 95%   BMI 25.27 kg/m²     Physical Exam  Constitutional:       Appearance: Normal appearance.   HENT:      Right Ear: Tympanic membrane normal.      Left Ear: Tympanic membrane normal.      Mouth/Throat:      Pharynx: No oropharyngeal exudate or posterior oropharyngeal erythema.   Eyes:      General: No scleral icterus.  Neck:      Vascular: No carotid bruit.   Cardiovascular:      Rate and Rhythm: Normal rate and regular rhythm.      Heart sounds: Normal heart sounds. No murmur heard.  Pulmonary:      Effort: No respiratory distress.      Breath sounds: No wheezing or rales.   Musculoskeletal:      Right lower leg: No edema.      Left lower leg: No edema.   Lymphadenopathy:      Cervical: No cervical adenopathy.   Neurological:      General: No focal deficit present.      Mental Status: She is alert.   Psychiatric:         Attention and Perception: Attention normal.         Mood and Affect: Mood normal.         Speech: Speech normal.            Result Review :                     Assessment and Plan    Diagnoses and all orders for this visit:    1. Hypertension, essential (Primary)  Assessment & Plan:  Blood pressure seems to be doing good and she feels better.  Continue current regimen     Orders:  -     Comprehensive Metabolic Panel  -     CBC (No Diff)    2. Mixed hyperlipidemia  Assessment & Plan:  Check lab and predicate any medication changes based on results     Orders:  -     Comprehensive Metabolic Panel  -     Lipid Panel    3. Atherosclerosis of native coronary artery of native heart without angina pectoris  Assessment & Plan:  Functionally doing well.  Continue " risk factor reduction strategies.  Encouraged her to discuss the Imdur usage with cardiology.  Follow with cardiology as recommended.        4. Stage 3a chronic kidney disease  Assessment & Plan:  Renal function improved off of the losartan/HCTZ.  Repeat labs and predicate further recommendations based on results     Orders:  -     Comprehensive Metabolic Panel  -     Microalbumin / Creatinine Urine Ratio - Urine, Clean Catch    5. Sleep apnea, unspecified type  Assessment & Plan:  Keep follow-up appointment with sleep specialist as scheduled          Follow Up   No follow-ups on file.  Patient was given instructions and counseling regarding her condition or for health maintenance advice. Please see specific information pulled into the AVS if appropriate.

## 2025-02-13 ENCOUNTER — OFFICE VISIT (OUTPATIENT)
Dept: SLEEP MEDICINE | Facility: HOSPITAL | Age: 75
End: 2025-02-13
Payer: MEDICARE

## 2025-02-13 VITALS — WEIGHT: 140 LBS | OXYGEN SATURATION: 99 % | BODY MASS INDEX: 24.8 KG/M2 | HEART RATE: 53 BPM | HEIGHT: 63 IN

## 2025-02-13 DIAGNOSIS — G47.33 OBSTRUCTIVE SLEEP APNEA: Primary | ICD-10-CM

## 2025-02-13 PROCEDURE — G0463 HOSPITAL OUTPT CLINIC VISIT: HCPCS

## 2025-02-13 PROCEDURE — 99213 OFFICE O/P EST LOW 20 MIN: CPT | Performed by: FAMILY MEDICINE

## 2025-02-13 PROCEDURE — 1160F RVW MEDS BY RX/DR IN RCRD: CPT | Performed by: FAMILY MEDICINE

## 2025-02-13 PROCEDURE — 1159F MED LIST DOCD IN RCRD: CPT | Performed by: FAMILY MEDICINE

## 2025-02-13 NOTE — PROGRESS NOTES
"Follow Up [facility] Disorders Center Note     Chief Complaint:  MJ     Primary Care Physician: Hudson Leon MD    Interval History:   The patient is a 74 y.o. female  who was last seen in the sleep lab: 2/9/2023.  Presents to for follow-up on MJ.  HST in 2022 showed AHI 7.4 lowest SpO2 89%.  Advised auto CPAP 6-15 cm H2O.  At first was having issue with her first mask for air was leaking out then went to a second mass which went over her nose however this coincided with the Derm appointment which led to a 30-day treatment and medication on her nose.  Since then was able to get used to original mass.  Patient reported at that time the air leak could be an issue when pressure bit higher later at night.  Pressure was changed to a set pressure of 10 cm H2O to help with air leak.  No follow-up since then.  Today patient reports no problems mask machine hypersomnia nonrestorative sleep needs supply renewal. Does not use mask and machine consistently per patient choice.     Downloaded PAP Data Reviewed For Compliance:  DME is [facility].  Downloads between 1/14/2025 - 2/12/2025 5.  Average usage is 5 hours 57 minutes.  Average AHI is 0.6.  Average auto CPAP pressure is 10 cm H2O    I have reviewed the above results and compared them with the patient's last downloads and reviewed with the patient.    Review of Systems:    A complete review of systems was done and all were negative with the exception of acid reflux    Social History:    Social History     Socioeconomic History    Marital status:    Tobacco Use    Smoking status: Never    Smokeless tobacco: Never   Vaping Use    Vaping status: Never Used   Substance and Sexual Activity    Alcohol use: Never    Drug use: Never    Sexual activity: Never       Allergies:  Patient has no known allergies.     Medication Review:  Reviewed.      Vital Signs:    Vitals:    02/13/25 1100   Pulse: 53   SpO2: 99%   Weight: 63.5 kg (140 lb)   Height: 160 cm (62.99\")     Body " mass index is 24.81 kg/m².    Physical Exam:    Constitutional:  Well developed 74 y.o. female that appears in no apparent distress.  Awake & oriented times 3.  Normal mood with normal recent and remote memory and normal judgement.  Eyes:  Conjunctivae normal.  Oropharynx: Previously, moist mucous membranes.    Self-administered Herndon Sleepiness Scale test results:  7  0-5 Lower normal daytime sleepiness  6-10 Higher normal daytime sleepiness  11-12 Mild, 13-15 Moderate, & 16-24 Severe excessive daytime sleepiness    Impression:   1. Obstructive sleep apnea        Obstructive sleep apnea adequately treated with CPAP 10 cm H2O with decreased compliance and usage. Advised to take with her on vacations; patient reports she will not. She understands higher risks for cardiovascular disease with untreated MJ.     Plan:  Good sleep hygiene measures should be maintained.  Normal body weight by Body mass index is 24.81 kg/m²..      After evaluating the patient and assessing results available, the patient is benefiting from the treatment being provided.     The patient will continue PAP.  After clinical evaluation and review of downloads, I recommend no changes to the patient's pressures.  A new prescription will be sent to the patient's DME.    Caution during activities that require prolonged concentration is strongly advised if sleepiness returns. Changing of PAP supplies regularly is important for effective use. Patient needs to change cushion on the mask or plugs on nasal pillows along with disposable filters once every month and change mask frame, tubing, headgear and Velcro straps every 6 months at the minimum.    I answered all of the patient's questions.  The patient will call for any problems and will follow up in 1 year.      Mikael Bowman MD  Sleep Medicine  02/13/25  11:19 EST

## 2025-02-20 NOTE — ASSESSMENT & PLAN NOTE
Functionally doing well.  Continue risk factor reduction strategies.  Encouraged her to discuss the Imdur usage with cardiology.  Follow with cardiology as recommended.

## 2025-02-20 NOTE — ASSESSMENT & PLAN NOTE
Renal function improved off of the losartan/HCTZ.  Repeat labs and predicate further recommendations based on results

## 2025-03-06 RX ORDER — AMLODIPINE BESYLATE 5 MG/1
5 TABLET ORAL DAILY
Qty: 90 TABLET | Refills: 1 | OUTPATIENT
Start: 2025-03-06

## 2025-03-12 RX ORDER — AMLODIPINE BESYLATE 5 MG/1
5 TABLET ORAL DAILY
Qty: 90 TABLET | Refills: 1 | OUTPATIENT
Start: 2025-03-12

## 2025-03-12 NOTE — TELEPHONE ENCOUNTER
Caller: MILLIE MERCER    Relationship: Emergency Contact    Best call back number: [phone]    Requested Prescriptions:   Requested Prescriptions     Pending Prescriptions Disp Refills    amLODIPine (NORVASC) 5 MG tablet 90 tablet 1     Sig: Take 1 tablet by mouth Daily.        Pharmacy where request should be sent:    [facility] #[id number] - [place], [place] - [address] AT [facility] [address]  - [phone]  - [phone]   Last office visit with prescribing clinician: 2/10/2025   Last telemedicine visit with prescribing clinician: Visit date not found   Next office visit with prescribing clinician: 9/2/2025     Additional details provided by patient: PATIENT'S  IS REQUESTING REFILL    Does the patient have less than a 3 day supply:  [] Yes  [x] No    Would you like a call back once the refill request has been completed: [] Yes [] No    If the office needs to give you a call back, can they leave a voicemail: [] Yes [] No    Denilson Saini   03/12/25 11:01 EDT

## 2025-03-21 DIAGNOSIS — I25.10 ATHEROSCLEROSIS OF NATIVE CORONARY ARTERY OF NATIVE HEART WITHOUT ANGINA PECTORIS: ICD-10-CM

## 2025-03-21 RX ORDER — CLOPIDOGREL BISULFATE 75 MG/1
75 TABLET ORAL DAILY
Qty: 90 TABLET | Refills: 1 | Status: SHIPPED | OUTPATIENT
Start: 2025-03-21

## 2025-03-26 RX ORDER — AMLODIPINE BESYLATE 5 MG/1
5 TABLET ORAL DAILY
Qty: 90 TABLET | Refills: 1 | Status: SHIPPED | OUTPATIENT
Start: 2025-03-26

## 2025-03-26 NOTE — TELEPHONE ENCOUNTER
Caller: MILLIE MERCER    Relationship: Emergency Contact    Best call back number: [phone]     Requested Prescriptions:   Requested Prescriptions     Pending Prescriptions Disp Refills    amLODIPine (NORVASC) 5 MG tablet 90 tablet 1     Sig: Take 1 tablet by mouth Daily.        Pharmacy where request should be sent: [facility] #[id number] - [place], [place] - [address] AT [facility] [address]  - [phone]  - [phone] FX     Last office visit with prescribing clinician: 2/10/2025   Last telemedicine visit with prescribing clinician: Visit date not found   Next office visit with prescribing clinician: 9/2/2025     Does the patient have less than a 3 day supply:  [] Yes  [x] No    Denilson Jarrell Rep   03/26/25 09:04 EDT

## 2025-03-31 RX ORDER — ATORVASTATIN CALCIUM 80 MG/1
40 TABLET, FILM COATED ORAL DAILY
Qty: 45 TABLET | Refills: 1 | Status: SHIPPED | OUTPATIENT
Start: 2025-03-31

## 2025-04-21 ENCOUNTER — HOSPITAL ENCOUNTER (OUTPATIENT)
Dept: CT IMAGING | Facility: HOSPITAL | Age: 75
Discharge: HOME | End: 2025-04-21
Admitting: NURSE PRACTITIONER
Payer: MEDICARE

## 2025-04-21 ENCOUNTER — OFFICE VISIT (OUTPATIENT)
Dept: FAMILY MEDICINE CLINIC | Age: 75
End: 2025-04-21
Payer: MEDICARE

## 2025-04-21 ENCOUNTER — RESULTS FOLLOW-UP (OUTPATIENT)
Dept: FAMILY MEDICINE CLINIC | Age: 75
End: 2025-04-21

## 2025-04-21 VITALS
OXYGEN SATURATION: 97 % | BODY MASS INDEX: 25.66 KG/M2 | HEIGHT: 63 IN | TEMPERATURE: 97.6 F | WEIGHT: 144.8 LBS | SYSTOLIC BLOOD PRESSURE: 128 MMHG | DIASTOLIC BLOOD PRESSURE: 70 MMHG | HEART RATE: 60 BPM

## 2025-04-21 DIAGNOSIS — R29.90 STROKE-LIKE SYMPTOMS: ICD-10-CM

## 2025-04-21 DIAGNOSIS — G51.0 BELL'S PALSY: Primary | ICD-10-CM

## 2025-04-21 PROCEDURE — 70450 CT HEAD/BRAIN W/O DYE: CPT

## 2025-04-21 PROCEDURE — 3074F SYST BP LT 130 MM HG: CPT | Performed by: NURSE PRACTITIONER

## 2025-04-21 PROCEDURE — 3078F DIAST BP <80 MM HG: CPT | Performed by: NURSE PRACTITIONER

## 2025-04-21 PROCEDURE — 99213 OFFICE O/P EST LOW 20 MIN: CPT | Performed by: NURSE PRACTITIONER

## 2025-04-21 RX ORDER — VALACYCLOVIR HYDROCHLORIDE 1 G/1
1000 TABLET, FILM COATED ORAL 3 TIMES DAILY
Qty: 21 TABLET | Refills: 0 | Status: SHIPPED | OUTPATIENT
Start: 2025-04-21 | End: 2025-04-28

## 2025-04-21 RX ORDER — VALACYCLOVIR HYDROCHLORIDE 1 G/1
1000 TABLET, FILM COATED ORAL 3 TIMES DAILY
Qty: 21 TABLET | Refills: 0 | Status: SHIPPED | OUTPATIENT
Start: 2025-04-21 | End: 2025-04-21 | Stop reason: SDUPTHER

## 2025-04-21 RX ORDER — PREDNISONE 20 MG/1
60 TABLET ORAL DAILY
Qty: 21 TABLET | Refills: 0 | Status: SHIPPED | OUTPATIENT
Start: 2025-04-21 | End: 2025-04-28

## 2025-04-21 NOTE — PROGRESS NOTES
"Linda Mitchell presents to [facility] with complaint of  lip droop (Lip / eye drooping since last Wednesday)    SUBJECTIVE  History of Present Illness    Patient is present with her  today to be evaluated for right facial droop.  Patient says her symptoms started on Tuesday with feeling sensation of hair in her right eye.  On Wednesday, she noticed that her mouth looked unusual in the mirror and was not able to move her right side of the mouth as she normally would.  She also noticed progression in her eye symptoms, she was no longer able to blink.  Patient says that she also had mild blurred vision but associated this due to tearing.  Patient is not able to move her right mouth as she normally would and notes water dripping out of her mouth whenever she is trying to drink.  No issues with actual swallowing reported.  Patient says that she is able to close her eye during sleep but has to force her eye to blink.  Patient does feel that her eye is dry for this reason.  Patient denies headache, loss of sensation, numbness or tingling, extremity weakness, confusion, speech difficulty.  Patient did have viral cold a few months ago.      OBJECTIVE  Vital Signs:   /70   Pulse 60   Temp 97.6 °F (36.4 °C) (Oral)   Ht 160 cm (62.99\")   Wt 65.7 kg (144 lb 12.8 oz)   SpO2 97%   BMI 25.66 kg/m²       Physical Exam  Vitals reviewed.   Constitutional:       General: She is not in acute distress.     Appearance: Normal appearance. She is not ill-appearing.   HENT:      Head: Normocephalic and atraumatic.   Eyes:      General: Gaze aligned appropriately. No visual field deficit.     Extraocular Movements:      Right eye: Normal extraocular motion.      Left eye: Normal extraocular motion.      Pupils: Pupils are equal, round, and reactive to light. Pupils are equal.      Comments: Right eyelid drooping-mild   Cardiovascular:      Rate and Rhythm: Normal rate and regular rhythm.    "   Pulses: Normal pulses.      Heart sounds: Normal heart sounds.   Pulmonary:      Effort: Pulmonary effort is normal.      Breath sounds: Normal breath sounds.   Musculoskeletal:      Cervical back: Neck supple.   Skin:     General: Skin is warm and dry.   Neurological:      General: No focal deficit present.      Mental Status: She is alert and oriented to person, place, and time. Mental status is at baseline.      Cranial Nerves: Facial asymmetry (Right lip drooping, right eyelid immobile) present. No dysarthria.      Sensory: Sensation is intact.      Motor: Motor function is intact. No weakness.      Coordination: Coordination is intact.      Gait: Gait is intact.      Comments: Pinprick testing to face and upper and lower extremities equal bilaterally, Strength 5/5 all 4 extremities. Unable to move right side of forehead to illicit forehead wrinkling   Psychiatric:         Mood and Affect: Mood normal.         Behavior: Behavior normal.         Judgment: Judgment normal.        Results review:  CT Head Without Contrast [[id number]] (Order [id number])  Order  Status: Final result     Study Notes     Tayler Coronel on 4/21/2025 11:06 AM EDT   STAT CALL REPORT TO [place] [facility] [phone]  Right sided facial drooping for 5 days     Appointment Information    Display Notes    STAT - OK PER [facility]ENNY CALL [place] [facility] [phone]                  PACS Images     Radiology Images      Study Result    Narrative & Impression   CT HEAD WO CONTRAST     Date of Exam: 4/21/2025 11:05 AM EDT     Indication: facial droop 5 days ago.     Comparison: None available.     Technique: Axial CT images were obtained of the head without contrast administration.  Reconstructed coronal and sagittal images were also obtained. Automated exposure control and iterative construction methods were used.        Findings:  There is generalized prominence of the ventricles and CSF containing spaces, not unusual for a patient of this age. No intra or  extra-axial mass lesions, fluid collections, or mass effect are seen. No focal areas of low-attenuation or evidence of acute   intracranial hemorrhage. There are atherosclerotic calcifications in the carotid siphons and in the vertebral arteries bilaterally. The paranasal sinuses and mastoid air cells are clear. Orbital structures appear unremarkable.     IMPRESSION:  Impression:  1.Generalized atrophy, not unusual for a patient of this age.  2.No acute intracranial findings.           Electronically Signed: Colin Deluca MD    4/21/2025 11:10 AM EDT    Workstation ID: [id number]           ASSESSMENT AND PLAN:  Diagnoses and all orders for this visit:    1. Bell's palsy (Primary)    2. Stroke-like symptoms  -     Cancel: CT Head Without Contrast; Future  -     CT Head Without Contrast; Future    Other orders  -     Discontinue: valACYclovir (Valtrex) 1000 MG tablet; Take 1 tablet by mouth 3 (Three) Times a Day for 7 days.  Dispense: 21 tablet; Refill: 0  -     valACYclovir (Valtrex) 1000 MG tablet; Take 1 tablet by mouth 3 (Three) Times a Day for 7 days.  Dispense: 21 tablet; Refill: 0  -     predniSONE (DELTASONE) 20 MG tablet; Take 3 tablets by mouth Daily for 7 days.  Dispense: 21 tablet; Refill: 0      Patient's head CT is negative for any acute finding or ischemic change.  Strongly suspect Tapia's palsy.  She was prescribed Valtrex and prednisone as listed above.  Patient was educated that it could take 8 to 12 weeks for symptoms to resolve.  If at that time, she is not seeing improvement, she may need to consider further neurological workup.  Patient was encouraged to place lubricant eyedrops to her right eye regularly throughout the day to prevent drying.  If her eye becomes to the point where it is not closing, she was instructed to tape her eye shut during sleep.      Follow Up   Return if symptoms worsen or fail to improve. Patient to notify office with any acute concerns or issues.  Patient verbalizes  understanding, agrees with plan of care and has no further questions upon discharge.     Patient was given instructions and counseling regarding her condition or for health maintenance advice. Please see specific information pulled into the AVS if appropriate.     Discussed the importance of following up with any needed screening tests/labs/specialist appointments and any requested follow-up recommended by me today. Importance of maintaining follow-up discussed and patient accepts that missed appointments can delay diagnosis and potentially lead to worsening of conditions.    Part of this note may be an electronic transcription/translation of spoken language to printed text using the Dragon Dictation System.

## 2025-04-22 NOTE — TELEPHONE ENCOUNTER
Caller: MILLIE SYLVIE    Best call back number: [phone]     What was the call regarding: MILLIE STATES THEY HAVE READ THE RESULTS AND UNDERSTAND THEM. MILLIE STATES IF THERE ISN'T ANY NEW INFORMATION THAT WASN'T SHARED ON [employer] THEN THEY DON'T NEED A PHONE CALL.

## 2025-04-28 ENCOUNTER — TELEPHONE (OUTPATIENT)
Dept: FAMILY MEDICINE CLINIC | Age: 75
End: 2025-04-28
Payer: MEDICARE

## 2025-04-28 RX ORDER — METOPROLOL TARTRATE 25 MG/1
12.5 TABLET, FILM COATED ORAL DAILY
Qty: 90 TABLET | Refills: 0 | Status: CANCELLED | OUTPATIENT
Start: 2025-04-28

## 2025-04-28 RX ORDER — METOPROLOL TARTRATE 25 MG/1
12.5 TABLET, FILM COATED ORAL 2 TIMES DAILY
Qty: 90 TABLET | Refills: 1 | Status: SHIPPED | OUTPATIENT
Start: 2025-04-28

## 2025-04-28 NOTE — TELEPHONE ENCOUNTER
Caller: Linda Mitchell    Relationship: Self    Best call back number: [phone]    Requested Prescriptions:   Requested Prescriptions     Pending Prescriptions Disp Refills    metoprolol tartrate (LOPRESSOR) 25 MG tablet 90 tablet 0     Sig: Take 0.5 tablets by mouth Daily.        Pharmacy where request should be sent: [facility]     Last office visit with prescribing clinician: 2/10/2025   Last telemedicine visit with prescribing clinician: Visit date not found   Next office visit with prescribing clinician: 9/2/2025     Additional details provided by patient: PATIENT HAS BEEN OUT OF THIS MEDICATION SINCE SUNDAY NIGHT. SHE DID NOT GET HER NIGHT DOSAGE LAST NIGHT OR THIS MORNING'S DOSE. PLEASE SEND NEW PRESCRIPTION WITH REFILLS TO  PHARMACY ASAP TODAY.    Does the patient have less than a 3 day supply:  [x] Yes  [] No    Would you like a call back once the refill request has been completed: [] Yes [] No    If the office needs to give you a call back, can they leave a voicemail: [] Yes [] No    Denilson Eden Rep   04/28/25 08:51 EDT

## 2025-05-01 ENCOUNTER — OFFICE VISIT (OUTPATIENT)
Dept: FAMILY MEDICINE CLINIC | Age: 75
End: 2025-05-01
Payer: MEDICARE

## 2025-05-01 VITALS
OXYGEN SATURATION: 97 % | DIASTOLIC BLOOD PRESSURE: 72 MMHG | HEART RATE: 52 BPM | SYSTOLIC BLOOD PRESSURE: 121 MMHG | BODY MASS INDEX: 25.16 KG/M2 | TEMPERATURE: 98.2 F | HEIGHT: 63 IN | WEIGHT: 142 LBS

## 2025-05-01 DIAGNOSIS — I10 HYPERTENSION, ESSENTIAL: ICD-10-CM

## 2025-05-01 DIAGNOSIS — G51.0 BELL'S PALSY: Primary | ICD-10-CM

## 2025-05-01 NOTE — ASSESSMENT & PLAN NOTE
She has had total recovery from the Bell's palsy.  She is worried about a recurrence and assured her we can get her in on an urgent basis and get back on medication again if she were to experience future symptoms

## 2025-05-01 NOTE — PROGRESS NOTES
Chief Complaint  bells palsy (Was diagnosed last wk w/ [place] wanting to make sure she doesn't need another round of Acyclovir & prednisone)    Subjective          Linda Mitchell presents to [facility]  History of Present Illness    Was diagnosed with Bell's palsy April 21 and that office note is reviewed.  She did undergo CT scan of the head which was unremarkable.  Symptoms have totally resolved.  Says got better just couple of days after she got started on the medication.  Initially had some difficulty closing the right eye but says no problem at all now and she demonstrates but winking at me.          Current Outpatient Medications on File Prior to Visit   Medication Sig Dispense Refill    Acetaminophen (TYLENOL 8 HOUR PO) Take  by mouth. PRN      amLODIPine (NORVASC) 5 MG tablet Take 1 tablet by mouth Daily. 90 tablet 1    atorvastatin (LIPITOR) 80 MG tablet TAKE 1/2 TABLET BY MOUTH DAILY 45 tablet 1    calcium carbonate EX (TUMS EX) 750 MG chewable tablet Chew 1 tablet Daily As Needed for Indigestion or Heartburn.      clopidogrel (PLAVIX) 75 MG tablet TAKE 1 TABLET BY MOUTH DAILY 90 tablet 1    famotidine (PEPCID) 10 MG tablet Take 4 tablets by mouth As Needed for Heartburn.      ferrous sulfate 325 (65 FE) MG tablet Take 1 tablet by mouth.      isosorbide mononitrate (IMDUR) 30 MG 24 hr tablet       metoprolol tartrate (LOPRESSOR) 25 MG tablet Take 0.5 tablets by mouth 2 (Two) Times a Day. 90 tablet 1    Misc Natural Products (Glucosamine Chond Cmp Advanced) tablet Take  by mouth.      naproxen sodium (ALEVE) 220 MG tablet Take 1 tablet by mouth 2 (Two) Times a Day As Needed.      pantoprazole (PROTONIX) 40 MG EC tablet TAKE 1 TABLET BY MOUTH DAILY 90 tablet 1    Vitamin E 450 MG capsule Take  by mouth.       No current facility-administered medications on file prior to visit.       Review of Systems         Objective   Vital Signs:   /72 (BP Location: Right arm,  "Patient Position: Sitting)   Pulse 52   Temp 98.2 °F (36.8 °C) (Oral)   Ht 160 cm (62.99\")   Wt 64.4 kg (142 lb)   SpO2 97%   BMI 25.16 kg/m²     Physical Exam     Tympanic membranes are clear  Oropharynx is clear  No cervical or supraclavicular adenopathy  No pre or postauricular adenopathy  Able to wrinkle forehead bilaterally in symmetrical fashion  No residual facial droop  Heart regular rate and rhythm 2/6 systolic murmur  Lungs clear      Result Review :                     Assessment and Plan    Diagnoses and all orders for this visit:    1. Bell's palsy (Primary)  Assessment & Plan:  She has had total recovery from the Bell's palsy.  She is worried about a recurrence and assured her we can get her in on an urgent basis and get back on medication again if she were to experience future symptoms      2. Hypertension, essential  Assessment & Plan:  Blood pressure doing great continue on current regimen           Follow Up   No follow-ups on file.  Patient was given instructions and counseling regarding her condition or for health maintenance advice. Please see specific information pulled into the AVS if appropriate.          "

## 2025-05-21 DIAGNOSIS — K21.9 GASTROESOPHAGEAL REFLUX DISEASE, UNSPECIFIED WHETHER ESOPHAGITIS PRESENT: ICD-10-CM

## 2025-05-21 RX ORDER — PANTOPRAZOLE SODIUM 40 MG/1
40 TABLET, DELAYED RELEASE ORAL DAILY
Qty: 90 TABLET | Refills: 1 | Status: SHIPPED | OUTPATIENT
Start: 2025-05-21

## 2025-06-05 RX ORDER — ISOSORBIDE MONONITRATE 30 MG/1
TABLET, EXTENDED RELEASE ORAL
OUTPATIENT
Start: 2025-06-05

## 2025-06-05 NOTE — TELEPHONE ENCOUNTER
Caller: SYLVIEMILLIE    Relationship: Emergency Contact    Best call back number: [phone]    Requested Prescriptions:   Requested Prescriptions     Pending Prescriptions Disp Refills    isosorbide mononitrate (IMDUR) 30 MG 24 hr tablet          Pharmacy where request [facility] be sent: [id number] - [place], [place] - [address] AT [facility] [address]  - [phone]  - [phone] FX     Last office visit with prescribing clinician: 5/1/2025   Last telemedicine visit with prescribing clinician: Visit date not found   Next office visit with prescribing clinician: 9/2/2025       Does the patient have less than a 3 day supply:  [] Yes  [x] No    Would you like a call back once the refill request has been completed: [] Yes [x] No    If the office needs to give you a call back, can they leave a voicemail: [] Yes [x] No    Denilson Morin   06/05/25 12:47 EDT

## 2025-06-28 DIAGNOSIS — I25.10 ATHEROSCLEROSIS OF NATIVE CORONARY ARTERY OF NATIVE HEART WITHOUT ANGINA PECTORIS: ICD-10-CM

## 2025-06-30 RX ORDER — CLOPIDOGREL BISULFATE 75 MG/1
75 TABLET ORAL DAILY
Qty: 90 TABLET | Refills: 1 | OUTPATIENT
Start: 2025-06-30

## 2025-07-03 RX ORDER — ATORVASTATIN CALCIUM 80 MG/1
40 TABLET, FILM COATED ORAL DAILY
Qty: 45 TABLET | Refills: 1 | OUTPATIENT
Start: 2025-07-03

## 2025-07-17 RX ORDER — METOPROLOL TARTRATE 25 MG/1
12.5 TABLET, FILM COATED ORAL 2 TIMES DAILY
Qty: 90 TABLET | Refills: 1 | Status: SHIPPED | OUTPATIENT
Start: 2025-07-17

## 2025-07-17 NOTE — TELEPHONE ENCOUNTER
Caller: MILLIE MERCER    Relationship: Emergency Contact    Best call back number: [phone]    Requested Prescriptions:   Requested Prescriptions     Pending Prescriptions Disp Refills    metoprolol tartrate (LOPRESSOR) 25 MG tablet 90 tablet 1     Sig: Take 0.5 tablets by mouth 2 (Two) Times a Day.        Pharmacy where request should be sent:    [facility] #[id number] - [place], [place] - [address] [facility] AT [facility] [address]  - [phone] [facility] [phone]  [phone]   Last office visit with prescribing clinician: 5/1/2025   Last telemedicine visit with prescribing clinician: Visit date not found   Next office visit with prescribing clinician: 9/2/2025     Additional details provided by patient: Patient's  called for refill    Does the patient have less than a 3 day supply:  [] Yes  [] No    Would you like a call back once the refill request has been completed: [] Yes [] No    If the office needs to give you a call back, can they leave a voicemail: [] Yes [] No    Denilson Saini Rep   07/17/25 08:12 EDT

## 2025-08-27 DIAGNOSIS — K21.9 GASTROESOPHAGEAL REFLUX DISEASE, UNSPECIFIED WHETHER ESOPHAGITIS PRESENT: ICD-10-CM

## 2025-08-28 RX ORDER — PANTOPRAZOLE SODIUM 40 MG/1
40 TABLET, DELAYED RELEASE ORAL DAILY
Qty: 90 TABLET | Refills: 1 | OUTPATIENT
Start: 2025-08-28